# Patient Record
Sex: MALE | Race: WHITE | NOT HISPANIC OR LATINO | Employment: FULL TIME | URBAN - METROPOLITAN AREA
[De-identification: names, ages, dates, MRNs, and addresses within clinical notes are randomized per-mention and may not be internally consistent; named-entity substitution may affect disease eponyms.]

---

## 2019-11-13 LAB
LEFT EYE DIABETIC RETINOPATHY: NORMAL
RIGHT EYE DIABETIC RETINOPATHY: NORMAL

## 2020-04-15 ENCOUNTER — TELEMEDICINE (OUTPATIENT)
Dept: FAMILY MEDICINE CLINIC | Facility: CLINIC | Age: 30
End: 2020-04-15
Payer: COMMERCIAL

## 2020-04-15 DIAGNOSIS — Z20.828 EXPOSURE TO SARS-ASSOCIATED CORONAVIRUS: Primary | ICD-10-CM

## 2020-04-15 DIAGNOSIS — Z20.828 EXPOSURE TO SARS-ASSOCIATED CORONAVIRUS: ICD-10-CM

## 2020-04-15 PROCEDURE — 87635 SARS-COV-2 COVID-19 AMP PRB: CPT

## 2020-04-15 PROCEDURE — 99203 OFFICE O/P NEW LOW 30 MIN: CPT | Performed by: NURSE PRACTITIONER

## 2020-04-17 LAB — SARS-COV-2 RNA SPEC QL NAA+PROBE: NOT DETECTED

## 2020-06-15 DIAGNOSIS — E10.9 TYPE 1 DIABETES MELLITUS ON INSULIN THERAPY (HCC): Primary | ICD-10-CM

## 2020-06-15 RX ORDER — INSULIN LISPRO 100 [IU]/ML
INJECTION, SOLUTION INTRAVENOUS; SUBCUTANEOUS
COMMUNITY
Start: 2016-08-11 | End: 2020-06-15 | Stop reason: SDUPTHER

## 2020-06-15 RX ORDER — LANCETS
EACH MISCELLANEOUS
COMMUNITY
Start: 2014-07-22 | End: 2022-04-07 | Stop reason: ALTCHOICE

## 2020-06-15 RX ORDER — FLASH GLUCOSE SENSOR
KIT MISCELLANEOUS
COMMUNITY
Start: 2020-04-12 | End: 2020-06-15 | Stop reason: SDUPTHER

## 2020-06-16 RX ORDER — FLASH GLUCOSE SENSOR
KIT MISCELLANEOUS
Qty: 15 EACH | Refills: 6 | Status: SHIPPED | OUTPATIENT
Start: 2020-06-16 | End: 2020-08-11 | Stop reason: SDUPTHER

## 2020-06-16 RX ORDER — INSULIN LISPRO 100 [IU]/ML
50 INJECTION, SOLUTION INTRAVENOUS; SUBCUTANEOUS DAILY
Qty: 5 PEN | Refills: 5 | Status: SHIPPED | OUTPATIENT
Start: 2020-06-16 | End: 2020-08-11 | Stop reason: ALTCHOICE

## 2020-08-11 ENCOUNTER — OFFICE VISIT (OUTPATIENT)
Dept: FAMILY MEDICINE CLINIC | Facility: CLINIC | Age: 30
End: 2020-08-11
Payer: COMMERCIAL

## 2020-08-11 VITALS
RESPIRATION RATE: 18 BRPM | WEIGHT: 197 LBS | OXYGEN SATURATION: 98 % | BODY MASS INDEX: 27.58 KG/M2 | HEART RATE: 72 BPM | DIASTOLIC BLOOD PRESSURE: 72 MMHG | HEIGHT: 71 IN | SYSTOLIC BLOOD PRESSURE: 126 MMHG | TEMPERATURE: 98.4 F

## 2020-08-11 DIAGNOSIS — E10.9 TYPE 1 DIABETES MELLITUS ON INSULIN THERAPY (HCC): Primary | ICD-10-CM

## 2020-08-11 DIAGNOSIS — E13.9 DIABETES 1.5, MANAGED AS TYPE 1 (HCC): ICD-10-CM

## 2020-08-11 PROCEDURE — 99213 OFFICE O/P EST LOW 20 MIN: CPT | Performed by: FAMILY MEDICINE

## 2020-08-11 PROCEDURE — 3725F SCREEN DEPRESSION PERFORMED: CPT | Performed by: FAMILY MEDICINE

## 2020-08-11 PROCEDURE — 3008F BODY MASS INDEX DOCD: CPT | Performed by: FAMILY MEDICINE

## 2020-08-11 PROCEDURE — 1036F TOBACCO NON-USER: CPT | Performed by: FAMILY MEDICINE

## 2020-08-11 RX ORDER — FLASH GLUCOSE SENSOR
KIT MISCELLANEOUS
Qty: 4 EACH | Refills: 6 | Status: SHIPPED | OUTPATIENT
Start: 2020-08-11 | End: 2021-02-25 | Stop reason: CLARIF

## 2020-08-11 NOTE — PROGRESS NOTES
Assessment/Plan:  DM 1 well controlled  Mahogany will get his labs completed asap  He is advised to loosen his control to make him more sensitive to low BS  He was advised to decrease his basal accordingly and check more frequently  Also the Dex com G6 CGM was rx and coverage by his insurance was confirmed with the pharmacy  Pt verbalized agreement and understanding of the plan of care as outlined during his OV  No problem-specific Assessment & Plan notes found for this encounter  Problem List Items Addressed This Visit     None      Visit Diagnoses     Type 1 diabetes mellitus on insulin therapy ([address] )    -  Primary    Relevant Medications    HumaLOG 100 UNIT/ML injection    Continuous Blood Gluc Sensor (FreeStyle Simon 14 Day Sensor) MISC    glucose blood test strip    Other Relevant Orders    Continous glucose monitoring dexcom placement    Continous glucose monitoring dexcom intrepretation    Diabetes 1 5, managed as type 1 (HCC)        Relevant Medications    HumaLOG 100 UNIT/ML injection    Other Relevant Orders    CBC and differential    Comprehensive metabolic panel    Lipid panel    TSH, 3rd generation    UA w Reflex to Microscopic w Reflex to Culture    Hemoglobin A1C            Subjective:      Patient ID: Cris No is a 34 y o  male  Mahogany is here to discuss his current state of health and refills on his medications if needed  He states he has had very bad experience with the freestyle simon bs sensor  He would like to try another CGM if possible  He also indicates his bs control is very tight and is experiencing some lows  He has no recent labs so insulin adjustment is difficult  He denies any other complaints        The following portions of the patient's history were reviewed and updated as appropriate: allergies, current medications, past family history, past medical history, past social history, past surgical history and problem list     Review of Systems   Constitutional: Negative for appetite change and fever  HENT: Negative for congestion and trouble swallowing  Respiratory: Negative for shortness of breath  Cardiovascular: Negative for chest pain  Gastrointestinal: Negative for abdominal pain  Genitourinary: Negative for difficulty urinating  Musculoskeletal: Negative for myalgias  Neurological: Negative for dizziness  Psychiatric/Behavioral: The patient is not nervous/anxious  Objective:  Patient's shoes and socks were not removed  Right Foot/Ankle   Right Foot Inspection  Skin Exam: skin normal skin not intact, no dry skin, no warmth, no callus, no erythema, no maceration, no abnormal color, no pre-ulcer, no ulcer and no callus                                  Left Foot/Ankle  Left Foot Inspection  Skin Exam: skin normalskin not intact, no dry skin, no warmth, no erythema, no maceration, normal color, no pre-ulcer, no ulcer and no callus                                             Assign Risk Category:  No deformity present; No loss of protective sensation; No weak pulses       Risk: 0        /72 (BP Location: Left arm, Patient Position: Sitting, Cuff Size: Large)   Pulse 72   Temp 98 4 °F (36 9 °C) (Temporal)   Resp 18   Ht 5' 11" (1 803 m)   Wt 89 4 kg (197 lb)   SpO2 98%   BMI 27 48 kg/m²          Physical Exam  Vitals signs and nursing note reviewed  Constitutional:       General: He is not in acute distress  Appearance: He is well-developed  HENT:      Head: Normocephalic  Right Ear: External ear normal       Left Ear: External ear normal    Eyes:      Pupils: Pupils are equal, round, and reactive to light  Neck:      Musculoskeletal: Normal range of motion  Cardiovascular:      Rate and Rhythm: Normal rate and regular rhythm  Pulses: no weak pulses  Pulmonary:      Effort: Pulmonary effort is normal       Breath sounds: Normal breath sounds  Abdominal:      Palpations: Abdomen is soft     Musculoskeletal: Normal range of motion  Feet:      Right foot:      Skin integrity: No ulcer, skin breakdown, erythema, warmth, callus or dry skin  Left foot:      Skin integrity: No ulcer, skin breakdown, erythema, warmth, callus or dry skin  Skin:     General: Skin is warm and dry  Neurological:      Mental Status: He is alert and oriented to person, place, and time  Psychiatric:         Behavior: Behavior normal          Thought Content:  Thought content normal          Judgment: Judgment normal

## 2020-11-17 ENCOUNTER — TELEMEDICINE (OUTPATIENT)
Dept: FAMILY MEDICINE CLINIC | Facility: CLINIC | Age: 30
End: 2020-11-17
Payer: COMMERCIAL

## 2020-11-17 DIAGNOSIS — R53.83 OTHER FATIGUE: ICD-10-CM

## 2020-11-17 DIAGNOSIS — R53.83 OTHER FATIGUE: Primary | ICD-10-CM

## 2020-11-17 PROCEDURE — U0003 INFECTIOUS AGENT DETECTION BY NUCLEIC ACID (DNA OR RNA); SEVERE ACUTE RESPIRATORY SYNDROME CORONAVIRUS 2 (SARS-COV-2) (CORONAVIRUS DISEASE [COVID-19]), AMPLIFIED PROBE TECHNIQUE, MAKING USE OF HIGH THROUGHPUT TECHNOLOGIES AS DESCRIBED BY CMS-2020-01-R: HCPCS | Performed by: NURSE PRACTITIONER

## 2020-11-17 PROCEDURE — 99213 OFFICE O/P EST LOW 20 MIN: CPT | Performed by: NURSE PRACTITIONER

## 2020-11-19 ENCOUNTER — TELEPHONE (OUTPATIENT)
Dept: FAMILY MEDICINE CLINIC | Facility: CLINIC | Age: 30
End: 2020-11-19

## 2020-11-19 LAB — SARS-COV-2 RNA SPEC QL NAA+PROBE: NOT DETECTED

## 2020-12-05 ENCOUNTER — LAB (OUTPATIENT)
Dept: LAB | Facility: CLINIC | Age: 30
End: 2020-12-05
Payer: COMMERCIAL

## 2020-12-05 DIAGNOSIS — E13.9 DIABETES 1.5, MANAGED AS TYPE 1 (HCC): ICD-10-CM

## 2020-12-05 LAB
ALBUMIN SERPL BCP-MCNC: 4.1 G/DL (ref 3.5–5)
ALP SERPL-CCNC: 59 U/L (ref 46–116)
ALT SERPL W P-5'-P-CCNC: 24 U/L (ref 12–78)
AMORPH URATE CRY URNS QL MICRO: NORMAL /HPF
ANION GAP SERPL CALCULATED.3IONS-SCNC: 5 MMOL/L (ref 4–13)
AST SERPL W P-5'-P-CCNC: 10 U/L (ref 5–45)
BACTERIA UR QL AUTO: NORMAL /HPF
BASOPHILS # BLD AUTO: 0.06 THOUSANDS/ΜL (ref 0–0.1)
BASOPHILS NFR BLD AUTO: 1 % (ref 0–1)
BILIRUB SERPL-MCNC: 0.46 MG/DL (ref 0.2–1)
BILIRUB UR QL STRIP: NEGATIVE
BUN SERPL-MCNC: 7 MG/DL (ref 5–25)
CALCIUM SERPL-MCNC: 9.5 MG/DL (ref 8.3–10.1)
CHLORIDE SERPL-SCNC: 104 MMOL/L (ref 100–108)
CHOLEST SERPL-MCNC: 121 MG/DL (ref 50–200)
CLARITY UR: CLEAR
CO2 SERPL-SCNC: 29 MMOL/L (ref 21–32)
COLOR UR: YELLOW
CREAT SERPL-MCNC: 0.81 MG/DL (ref 0.6–1.3)
EOSINOPHIL # BLD AUTO: 0.2 THOUSAND/ΜL (ref 0–0.61)
EOSINOPHIL NFR BLD AUTO: 3 % (ref 0–6)
ERYTHROCYTE [DISTWIDTH] IN BLOOD BY AUTOMATED COUNT: 12.9 % (ref 11.6–15.1)
EST. AVERAGE GLUCOSE BLD GHB EST-MCNC: 160 MG/DL
GFR SERPL CREATININE-BSD FRML MDRD: 121 ML/MIN/1.73SQ M
GLUCOSE P FAST SERPL-MCNC: 284 MG/DL (ref 65–99)
GLUCOSE UR STRIP-MCNC: ABNORMAL MG/DL
HBA1C MFR BLD: 7.2 %
HCT VFR BLD AUTO: 43.1 % (ref 36.5–49.3)
HDLC SERPL-MCNC: 54 MG/DL
HGB BLD-MCNC: 14.6 G/DL (ref 12–17)
HGB UR QL STRIP.AUTO: NEGATIVE
IMM GRANULOCYTES # BLD AUTO: 0.01 THOUSAND/UL (ref 0–0.2)
IMM GRANULOCYTES NFR BLD AUTO: 0 % (ref 0–2)
KETONES UR STRIP-MCNC: NEGATIVE MG/DL
LDLC SERPL CALC-MCNC: 49 MG/DL (ref 0–100)
LEUKOCYTE ESTERASE UR QL STRIP: ABNORMAL
LYMPHOCYTES # BLD AUTO: 2.7 THOUSANDS/ΜL (ref 0.6–4.47)
LYMPHOCYTES NFR BLD AUTO: 45 % (ref 14–44)
MCH RBC QN AUTO: 30.4 PG (ref 26.8–34.3)
MCHC RBC AUTO-ENTMCNC: 33.9 G/DL (ref 31.4–37.4)
MCV RBC AUTO: 90 FL (ref 82–98)
MONOCYTES # BLD AUTO: 0.61 THOUSAND/ΜL (ref 0.17–1.22)
MONOCYTES NFR BLD AUTO: 10 % (ref 4–12)
NEUTROPHILS # BLD AUTO: 2.53 THOUSANDS/ΜL (ref 1.85–7.62)
NEUTS SEG NFR BLD AUTO: 41 % (ref 43–75)
NITRITE UR QL STRIP: NEGATIVE
NON-SQ EPI CELLS URNS QL MICRO: NORMAL /HPF
NONHDLC SERPL-MCNC: 67 MG/DL
NRBC BLD AUTO-RTO: 0 /100 WBCS
PH UR STRIP.AUTO: 6.5 [PH]
PLATELET # BLD AUTO: 203 THOUSANDS/UL (ref 149–390)
PMV BLD AUTO: 10.8 FL (ref 8.9–12.7)
POTASSIUM SERPL-SCNC: 4.3 MMOL/L (ref 3.5–5.3)
PROT SERPL-MCNC: 7 G/DL (ref 6.4–8.2)
PROT UR STRIP-MCNC: NEGATIVE MG/DL
RBC # BLD AUTO: 4.81 MILLION/UL (ref 3.88–5.62)
RBC #/AREA URNS AUTO: NORMAL /HPF
SODIUM SERPL-SCNC: 138 MMOL/L (ref 136–145)
SP GR UR STRIP.AUTO: 1.02 (ref 1–1.03)
TRIGL SERPL-MCNC: 89 MG/DL
TSH SERPL DL<=0.05 MIU/L-ACNC: 2.16 UIU/ML (ref 0.36–3.74)
UROBILINOGEN UR QL STRIP.AUTO: 0.2 E.U./DL
WBC # BLD AUTO: 6.11 THOUSAND/UL (ref 4.31–10.16)
WBC #/AREA URNS AUTO: NORMAL /HPF

## 2020-12-05 PROCEDURE — 83036 HEMOGLOBIN GLYCOSYLATED A1C: CPT

## 2020-12-05 PROCEDURE — 80061 LIPID PANEL: CPT

## 2020-12-05 PROCEDURE — 84443 ASSAY THYROID STIM HORMONE: CPT

## 2020-12-05 PROCEDURE — 3051F HG A1C>EQUAL 7.0%<8.0%: CPT | Performed by: NURSE PRACTITIONER

## 2020-12-05 PROCEDURE — 80053 COMPREHEN METABOLIC PANEL: CPT

## 2020-12-05 PROCEDURE — 81001 URINALYSIS AUTO W/SCOPE: CPT | Performed by: NURSE PRACTITIONER

## 2020-12-05 PROCEDURE — 85025 COMPLETE CBC W/AUTO DIFF WBC: CPT

## 2020-12-05 PROCEDURE — 36415 COLL VENOUS BLD VENIPUNCTURE: CPT

## 2020-12-09 ENCOUNTER — TELEMEDICINE (OUTPATIENT)
Dept: FAMILY MEDICINE CLINIC | Facility: CLINIC | Age: 30
End: 2020-12-09
Payer: COMMERCIAL

## 2020-12-09 DIAGNOSIS — E10.9 TYPE 1 DIABETES MELLITUS ON INSULIN THERAPY (HCC): ICD-10-CM

## 2020-12-09 DIAGNOSIS — F41.9 ANXIETY: Primary | ICD-10-CM

## 2020-12-09 DIAGNOSIS — F32.0 CURRENT MILD EPISODE OF MAJOR DEPRESSIVE DISORDER WITHOUT PRIOR EPISODE (HCC): ICD-10-CM

## 2020-12-09 PROCEDURE — 1036F TOBACCO NON-USER: CPT | Performed by: NURSE PRACTITIONER

## 2020-12-09 PROCEDURE — 99213 OFFICE O/P EST LOW 20 MIN: CPT | Performed by: NURSE PRACTITIONER

## 2020-12-09 RX ORDER — CLONAZEPAM 0.5 MG/1
0.5 TABLET ORAL 2 TIMES DAILY PRN
Qty: 60 TABLET | Refills: 1 | Status: SHIPPED | OUTPATIENT
Start: 2020-12-09 | End: 2021-02-25 | Stop reason: SDUPTHER

## 2020-12-09 RX ORDER — ESCITALOPRAM OXALATE 5 MG/1
5 TABLET ORAL DAILY
Qty: 30 TABLET | Refills: 3 | Status: SHIPPED | OUTPATIENT
Start: 2020-12-09 | End: 2021-02-25 | Stop reason: CLARIF

## 2020-12-28 DIAGNOSIS — E10.9 TYPE 1 DIABETES MELLITUS ON INSULIN THERAPY (HCC): ICD-10-CM

## 2020-12-28 RX ORDER — FLASH GLUCOSE SENSOR
KIT MISCELLANEOUS
Qty: 4 EACH | Refills: 6 | Status: CANCELLED | OUTPATIENT
Start: 2020-12-28

## 2021-02-20 DIAGNOSIS — F41.9 ANXIETY: ICD-10-CM

## 2021-02-22 RX ORDER — CLONAZEPAM 0.5 MG/1
0.5 TABLET ORAL 2 TIMES DAILY PRN
Qty: 60 TABLET | Refills: 1 | OUTPATIENT
Start: 2021-02-22

## 2021-02-25 ENCOUNTER — OFFICE VISIT (OUTPATIENT)
Dept: FAMILY MEDICINE CLINIC | Facility: CLINIC | Age: 31
End: 2021-02-25
Payer: COMMERCIAL

## 2021-02-25 VITALS
TEMPERATURE: 99 F | WEIGHT: 179 LBS | HEART RATE: 78 BPM | SYSTOLIC BLOOD PRESSURE: 124 MMHG | DIASTOLIC BLOOD PRESSURE: 80 MMHG | BODY MASS INDEX: 25.06 KG/M2 | RESPIRATION RATE: 16 BRPM | OXYGEN SATURATION: 98 % | HEIGHT: 71 IN

## 2021-02-25 DIAGNOSIS — F41.9 ANXIETY: ICD-10-CM

## 2021-02-25 DIAGNOSIS — E10.9 TYPE 1 DIABETES MELLITUS WITHOUT COMPLICATION (HCC): ICD-10-CM

## 2021-02-25 DIAGNOSIS — Z11.4 SCREENING FOR HIV (HUMAN IMMUNODEFICIENCY VIRUS): Primary | ICD-10-CM

## 2021-02-25 DIAGNOSIS — K31.84 SLOW GASTRIC MOTILITY: ICD-10-CM

## 2021-02-25 DIAGNOSIS — R53.83 OTHER FATIGUE: ICD-10-CM

## 2021-02-25 PROCEDURE — 99213 OFFICE O/P EST LOW 20 MIN: CPT | Performed by: NURSE PRACTITIONER

## 2021-02-25 PROCEDURE — 1036F TOBACCO NON-USER: CPT | Performed by: NURSE PRACTITIONER

## 2021-02-25 PROCEDURE — 3008F BODY MASS INDEX DOCD: CPT | Performed by: NURSE PRACTITIONER

## 2021-02-25 RX ORDER — BLOOD-GLUCOSE TRANSMITTER
EACH MISCELLANEOUS
COMMUNITY
Start: 2021-02-20 | End: 2021-07-08 | Stop reason: SDUPTHER

## 2021-02-25 RX ORDER — CLONAZEPAM 0.5 MG/1
0.5 TABLET ORAL 2 TIMES DAILY PRN
Qty: 60 TABLET | Refills: 1 | Status: SHIPPED | OUTPATIENT
Start: 2021-02-25 | End: 2021-05-21 | Stop reason: SDUPTHER

## 2021-02-25 NOTE — PROGRESS NOTES
Assessment/Plan:    Advised patient he physical assessment was unremarkable  Will order some routine follow-up labs he is to complete in 1-3 months will call him with the results for possible in office visit medications were refilled as prescribed Lexapro was DC dosing all possible side effects of the prescribed medications reviewed all questions were answered  Patient verbalized agreement and understanding of the medication as outlined during his office visit today  Depression Screening and Follow-up Plan: Patient assessed for underlying major depression  Brief counseling provided and recommend additional follow-up/re-evaluation next office visit  Patient advised to follow-up with PCP for further management              Problem List Items Addressed This Visit     None      Visit Diagnoses     Screening for HIV (human immunodeficiency virus)    -  Primary    Relevant Orders    HIV 1/2 Antigen/Antibody (4th Generation) w Reflex [facility]    Type 1 diabetes mellitus without complication (HCC)        Relevant Medications    clonazePAM (KlonoPIN) 0 5 mg tablet    Other Relevant Orders    HIV 1/2 Antigen/Antibody (4th Generation) w Reflex [facility]    CBC and differential    Comprehensive metabolic panel    Lipid panel    TSH, 3rd generation    UA w Reflex to Microscopic w Reflex to Culture    Microalbumin / creatinine urine ratio    NM gastric emptying    Hemoglobin A1C    Slow gastric motility        Relevant Orders    NM gastric emptying    Anxiety        Relevant Medications    clonazePAM (KlonoPIN) 0 5 mg tablet    Other Relevant Orders    HIV 1/2 Antigen/Antibody (4th Generation) w Reflex [facility]    CBC and differential    Comprehensive metabolic panel    Lipid panel    TSH, 3rd generation    UA w Reflex to Microscopic w Reflex to Culture    Microalbumin / creatinine urine ratio    NM gastric emptying    Hemoglobin A1C    Other fatigue        Relevant Orders    HIV 1/2 Antigen/Antibody (4th Generation) w Reflex [facility] CBC and differential    Comprehensive metabolic panel    Lipid panel    TSH, 3rd generation    UA w Reflex to Microscopic w Reflex to Culture    Microalbumin / creatinine urine ratio    Hemoglobin A1C            Subjective:      Patient ID: Viktoriya Jackie is a 31 y o  male  Patient is here for a follow-up for some recent change in medication for his increased anxiety  Patient was given Lexapro and clonazepam   He states he stopped the Lexapro as he feels it did not work very well for him he continues the clonazepam stating that he feels like it Evens him out only takes it daily as needed  He states that significantly reduced his anxiety  The following portions of the patient's history were reviewed and updated as appropriate: allergies, current medications, past family history, past medical history, past social history, past surgical history and problem list     Review of Systems   Constitutional: Negative for appetite change and fever  HENT: Negative for congestion and trouble swallowing  Respiratory: Negative for shortness of breath  Cardiovascular: Negative for chest pain  Gastrointestinal: Negative for abdominal pain  Pt has t1 DM for 20 years and stated he feels slow gastric emptying On occasion which causes him to have diabetic lows  Genitourinary: Negative for difficulty urinating  Musculoskeletal: Negative for myalgias  Neurological: Negative for dizziness  Psychiatric/Behavioral: The patient is not nervous/anxious  Objective:      /80 (BP Location: Left arm, Patient Position: Sitting, Cuff Size: Standard)   Pulse 78   Temp 99 °F (37 2 °C) (Temporal)   Resp 16   Ht 5' 11" (1 803 m)   Wt 81 2 kg (179 lb)   SpO2 98%   BMI 24 97 kg/m²          Physical Exam  Vitals signs and nursing note reviewed  Constitutional:       General: He is not in acute distress  Appearance: He is well-developed  HENT:      Head: Normocephalic        Right Ear: External ear normal       Left Ear: External ear normal    Eyes:      Pupils: Pupils are equal, round, and reactive to light  Neck:      Musculoskeletal: Normal range of motion  Cardiovascular:      Rate and Rhythm: Normal rate and regular rhythm  Pulses: Normal pulses  Heart sounds: Normal heart sounds  Pulmonary:      Effort: Pulmonary effort is normal       Breath sounds: Normal breath sounds  Abdominal:      Palpations: Abdomen is soft  Musculoskeletal: Normal range of motion  Skin:     General: Skin is warm and dry  Neurological:      Mental Status: He is alert and oriented to person, place, and time  Psychiatric:         Behavior: Behavior normal          Thought Content:  Thought content normal          Judgment: Judgment normal

## 2021-03-30 DIAGNOSIS — Z23 ENCOUNTER FOR IMMUNIZATION: ICD-10-CM

## 2021-04-02 ENCOUNTER — IMMUNIZATIONS (OUTPATIENT)
Dept: FAMILY MEDICINE CLINIC | Facility: HOSPITAL | Age: 31
End: 2021-04-02

## 2021-04-02 DIAGNOSIS — Z23 ENCOUNTER FOR IMMUNIZATION: Primary | ICD-10-CM

## 2021-04-02 PROCEDURE — 91300 SARS-COV-2 / COVID-19 MRNA VACCINE (PFIZER-BIONTECH) 30 MCG: CPT

## 2021-04-02 PROCEDURE — 0001A SARS-COV-2 / COVID-19 MRNA VACCINE (PFIZER-BIONTECH) 30 MCG: CPT

## 2021-04-26 ENCOUNTER — IMMUNIZATIONS (OUTPATIENT)
Dept: FAMILY MEDICINE CLINIC | Facility: HOSPITAL | Age: 31
End: 2021-04-26

## 2021-04-26 DIAGNOSIS — Z23 ENCOUNTER FOR IMMUNIZATION: Primary | ICD-10-CM

## 2021-04-26 PROCEDURE — 0002A SARS-COV-2 / COVID-19 MRNA VACCINE (PFIZER-BIONTECH) 30 MCG: CPT

## 2021-04-26 PROCEDURE — 91300 SARS-COV-2 / COVID-19 MRNA VACCINE (PFIZER-BIONTECH) 30 MCG: CPT

## 2021-05-10 ENCOUNTER — OFFICE VISIT (OUTPATIENT)
Dept: FAMILY MEDICINE CLINIC | Facility: CLINIC | Age: 31
End: 2021-05-10
Payer: COMMERCIAL

## 2021-05-10 ENCOUNTER — APPOINTMENT (OUTPATIENT)
Dept: RADIOLOGY | Facility: MEDICAL CENTER | Age: 31
End: 2021-05-10
Payer: COMMERCIAL

## 2021-05-10 VITALS
HEART RATE: 78 BPM | BODY MASS INDEX: 25.2 KG/M2 | OXYGEN SATURATION: 98 % | RESPIRATION RATE: 16 BRPM | SYSTOLIC BLOOD PRESSURE: 130 MMHG | WEIGHT: 180 LBS | TEMPERATURE: 98.4 F | HEIGHT: 71 IN | DIASTOLIC BLOOD PRESSURE: 80 MMHG

## 2021-05-10 DIAGNOSIS — M25.561 ACUTE PAIN OF RIGHT KNEE: ICD-10-CM

## 2021-05-10 DIAGNOSIS — M25.561 ACUTE PAIN OF RIGHT KNEE: Primary | ICD-10-CM

## 2021-05-10 PROCEDURE — 1036F TOBACCO NON-USER: CPT | Performed by: NURSE PRACTITIONER

## 2021-05-10 PROCEDURE — 73562 X-RAY EXAM OF KNEE 3: CPT

## 2021-05-10 PROCEDURE — 99213 OFFICE O/P EST LOW 20 MIN: CPT | Performed by: NURSE PRACTITIONER

## 2021-05-10 PROCEDURE — 3008F BODY MASS INDEX DOCD: CPT | Performed by: NURSE PRACTITIONER

## 2021-05-10 RX ORDER — BLOOD-GLUCOSE SENSOR
EACH MISCELLANEOUS
COMMUNITY
Start: 2021-04-18 | End: 2021-07-08 | Stop reason: SDUPTHER

## 2021-05-10 NOTE — PROGRESS NOTES
Assessment/Plan:    No problem-specific Assessment & Plan notes found for this encounter  Problem List Items Addressed This Visit     None      Visit Diagnoses     Acute pain of right knee    -  Primary    Relevant Orders    XR knee 3 vw right non injury            Subjective:      Patient ID: Lesly Bundy is a 31 y o  male  Patient is here for an assessment for right knee pain  It should be noted patient has left knee history of injury and some laxity in that knee but currently no pain  Patient is a well controlled type 1 diabetic and denies any injury related to this knee pain  The following portions of the patient's history were reviewed and updated as appropriate: allergies, current medications, past family history, past medical history, past social history, past surgical history and problem list     Review of Systems   Constitutional: Negative for appetite change and fever  HENT: Negative for congestion and trouble swallowing  Respiratory: Negative for shortness of breath  Cardiovascular: Negative for chest pain  Gastrointestinal: Negative for abdominal pain  Genitourinary: Negative for difficulty urinating  Musculoskeletal: Positive for arthralgias (r knee pain Most painful with going up and down stairs  )  Negative for myalgias  Neurological: Negative for dizziness  Psychiatric/Behavioral: The patient is not nervous/anxious  Objective: There were no vitals taken for this visit  Physical Exam  Constitutional:       Appearance: Normal appearance  He is normal weight  HENT:      Right Ear: External ear normal       Left Ear: External ear normal    Neck:      Musculoskeletal: Normal range of motion  Cardiovascular:      Rate and Rhythm: Normal rate and regular rhythm  Heart sounds: Normal heart sounds  Pulmonary:      Effort: Pulmonary effort is normal       Breath sounds: Normal breath sounds     Musculoskeletal:         General: Tenderness (r knee stable tenderness ) present  Neurological:      General: No focal deficit present  Mental Status: He is alert and oriented to person, place, and time     Psychiatric:         Mood and Affect: Mood normal          Behavior: Behavior normal

## 2021-05-18 DIAGNOSIS — E10.9 TYPE 1 DIABETES MELLITUS WITHOUT COMPLICATION (HCC): ICD-10-CM

## 2021-05-18 DIAGNOSIS — F41.9 ANXIETY: ICD-10-CM

## 2021-05-21 DIAGNOSIS — F41.9 ANXIETY: ICD-10-CM

## 2021-05-21 DIAGNOSIS — E10.9 TYPE 1 DIABETES MELLITUS WITHOUT COMPLICATION (HCC): ICD-10-CM

## 2021-05-25 RX ORDER — CLONAZEPAM 0.5 MG/1
0.5 TABLET ORAL 2 TIMES DAILY PRN
Qty: 60 TABLET | Refills: 1 | OUTPATIENT
Start: 2021-05-25

## 2021-05-25 RX ORDER — CLONAZEPAM 0.5 MG/1
0.5 TABLET ORAL 2 TIMES DAILY PRN
Qty: 60 TABLET | Refills: 1 | Status: SHIPPED | OUTPATIENT
Start: 2021-05-25 | End: 2021-09-10 | Stop reason: SDUPTHER

## 2021-06-08 ENCOUNTER — OFFICE VISIT (OUTPATIENT)
Dept: URGENT CARE | Facility: CLINIC | Age: 31
End: 2021-06-08
Payer: COMMERCIAL

## 2021-06-08 VITALS
DIASTOLIC BLOOD PRESSURE: 79 MMHG | BODY MASS INDEX: 25.76 KG/M2 | SYSTOLIC BLOOD PRESSURE: 144 MMHG | OXYGEN SATURATION: 99 % | HEIGHT: 71 IN | HEART RATE: 77 BPM | TEMPERATURE: 98.9 F | RESPIRATION RATE: 18 BRPM | WEIGHT: 184 LBS

## 2021-06-08 DIAGNOSIS — R05.9 COUGH: Primary | ICD-10-CM

## 2021-06-08 PROCEDURE — 99203 OFFICE O/P NEW LOW 30 MIN: CPT | Performed by: PHYSICIAN ASSISTANT

## 2021-06-08 RX ORDER — ALBUTEROL SULFATE 90 UG/1
2 AEROSOL, METERED RESPIRATORY (INHALATION) 4 TIMES DAILY
Qty: 8 G | Refills: 0 | Status: SHIPPED | OUTPATIENT
Start: 2021-06-08 | End: 2021-08-16

## 2021-06-08 RX ORDER — BENZONATATE 100 MG/1
100 CAPSULE ORAL 3 TIMES DAILY PRN
Qty: 30 CAPSULE | Refills: 0 | Status: SHIPPED | OUTPATIENT
Start: 2021-06-08 | End: 2021-08-16

## 2021-06-08 RX ORDER — AMOXICILLIN AND CLAVULANATE POTASSIUM 875; 125 MG/1; MG/1
1 TABLET, FILM COATED ORAL EVERY 12 HOURS SCHEDULED
Qty: 14 TABLET | Refills: 0 | Status: SHIPPED | OUTPATIENT
Start: 2021-06-08 | End: 2021-06-15

## 2021-06-08 NOTE — PROGRESS NOTES
[address]        NAME: Josh Holden is a 31 y o  male  : 1990    MRN: [id number]  DATE: 2021  TIME: 11:12 AM    Assessment and Plan   Cough [R05]  1  Cough  amoxicillin-clavulanate (AUGMENTIN) 875-125 mg per tablet    albuterol (PROVENTIL HFA,VENTOLIN HFA) 90 mcg/act inhaler    benzonatate (TESSALON PERLES) 100 mg capsule    CANCELED: Novel Coronavirus (COVID-19), PCR [facility] Collected at [facility]    CANCELED: XR chest pa & lateral         Patient Instructions   Patient Instructions     Lungs are clear here  He has some sinus pressure we can treat with antibiotics and cough medicine  Albuterol as needed for chest tightness  Continue Tylenol  Follow-up with PCP in few days  If worse go to the ER  He is vaccinated against COVID  Follow up with PCP in 3-5 days  Proceed to  ER if symptoms worsen  Chief Complaint     Chief Complaint   Patient presents with    Sinus Problem     pt c/o of sinus pressure with painful cough, sore throat for 2 days ago getting worse         History of Present Illness         61-year-old male presents to the clinic with sinus pressure cough chest congestion shortness of breath for 2 days  No fever  Taking over-the-counter cold medicines with no relief  His chest hurts when he coughs  No nausea vomiting  He is vaccinated against COVID  No known exposures  Review of Systems   Review of Systems   Constitutional: Negative for chills and fever  HENT: Positive for congestion, postnasal drip, rhinorrhea, sinus pressure, sinus pain and sore throat  Negative for ear pain and facial swelling  Eyes: Negative for pain, redness and visual disturbance  Respiratory: Positive for cough, chest tightness and shortness of breath  Negative for wheezing  Cardiovascular: Negative for chest pain and palpitations  Gastrointestinal: Negative for abdominal pain, diarrhea, nausea and vomiting     Neurological: Negative for dizziness and headaches  Current Medications       Current Outpatient Medications:     clonazePAM (KlonoPIN) 0 5 mg tablet, Take 1 tablet (0 5 mg total) by mouth 2 (two) times a day as needed for anxiety, Disp: 60 tablet, Rfl: 1    Continuous Blood Gluc Sensor (Dexcom G6 Sensor) MISC, USE SUBCUTANEOUSLY EVERY 11 DAYS AS DIRECTED, Disp: , Rfl:     Continuous Blood Gluc Transmit (Dexcom G6 Transmitter) MISC, , Disp: , Rfl:     glucose blood test strip, 1 each by Other route 4 (four) times a day Use as instructed, Disp: 200 each, Rfl: 6    HumaLOG 100 UNIT/ML injection, 100 Units, Disp: , Rfl:     Accu-Chek Softclix Lancets lancets, use to check sugars  DX IDDM  , Disp: , Rfl:     albuterol (PROVENTIL HFA,VENTOLIN HFA) 90 mcg/act inhaler, Inhale 2 puffs 4 (four) times a day, Disp: 8 g, Rfl: 0    amoxicillin-clavulanate (AUGMENTIN) 875-125 mg per tablet, Take 1 tablet by mouth every 12 (twelve) hours for 7 days, Disp: 14 tablet, Rfl: 0    benzonatate (TESSALON PERLES) 100 mg capsule, Take 1 capsule (100 mg total) by mouth 3 (three) times a day as needed for cough, Disp: 30 capsule, Rfl: 0    Current Allergies     Allergies as of 06/08/2021    (No Known Allergies)            The following portions of the patient's history were reviewed and updated as appropriate: allergies, current medications, past family history, past medical history, past social history, past surgical history and problem list      History reviewed  No pertinent past medical history  Past Surgical History:   Procedure Laterality Date    ANKLE FRACTURE SURGERY Left        Family History   Problem Relation Age of Onset    No Known Problems Mother     No Known Problems Father          Medications have been verified          Objective   /79   Pulse 77   Temp 98 9 °F (37 2 °C)   Resp 18   Ht 5' 11" (1 803 m)   Wt 83 5 kg (184 lb)   SpO2 99%   BMI 25 66 kg/m²        Physical Exam     Physical Exam  Constitutional:       General: He is not in acute distress  Appearance: He is well-developed  HENT:      Head: Normocephalic and atraumatic  Right Ear: Tympanic membrane, ear canal and external ear normal  No middle ear effusion  Tympanic membrane is not erythematous, retracted or bulging  Left Ear: Tympanic membrane, ear canal and external ear normal   No middle ear effusion  Tympanic membrane is not erythematous, retracted or bulging  Nose: Mucosal edema and congestion present  No rhinorrhea  Right Sinus: No maxillary sinus tenderness or frontal sinus tenderness  Left Sinus: No maxillary sinus tenderness or frontal sinus tenderness  Mouth/Throat:      Pharynx: Posterior oropharyngeal erythema present  Comments: Post nasal drip  Eyes:      General:         Right eye: No discharge  Left eye: No discharge  Conjunctiva/sclera: Conjunctivae normal       Right eye: Right conjunctiva is not injected  No chemosis  Left eye: Left conjunctiva is not injected  No chemosis  Pupils: Pupils are equal, round, and reactive to light  Neck:      Musculoskeletal: Normal range of motion and neck supple  Cardiovascular:      Rate and Rhythm: Normal rate and regular rhythm  Heart sounds: Normal heart sounds  Pulmonary:      Effort: Pulmonary effort is normal  No respiratory distress  Breath sounds: Normal breath sounds  No wheezing or rales  Lymphadenopathy:      Cervical: No cervical adenopathy  Right cervical: No superficial cervical adenopathy  Left cervical: No superficial cervical adenopathy  Skin:     General: Skin is warm and dry  Findings: No rash  Neurological:      Mental Status: He is alert and oriented to person, place, and time  Cranial Nerves: No cranial nerve deficit

## 2021-06-08 NOTE — PATIENT INSTRUCTIONS
Lungs are clear here  He has some sinus pressure we can treat with antibiotics and cough medicine  Albuterol as needed for chest tightness  Continue Tylenol  Follow-up with PCP in few days  If worse go to the ER  He is vaccinated against COVID

## 2021-07-08 DIAGNOSIS — E10.65 TYPE 1 DIABETES MELLITUS WITH HYPERGLYCEMIA (HCC): Primary | ICD-10-CM

## 2021-07-08 RX ORDER — BLOOD-GLUCOSE SENSOR
EACH MISCELLANEOUS
Qty: 3 EACH | Refills: 2 | Status: SHIPPED | OUTPATIENT
Start: 2021-07-08 | End: 2022-01-24 | Stop reason: SDUPTHER

## 2021-07-08 RX ORDER — BLOOD-GLUCOSE TRANSMITTER
1 EACH MISCELLANEOUS
Qty: 1 EACH | Refills: 1 | Status: SHIPPED | OUTPATIENT
Start: 2021-07-08 | End: 2022-02-01 | Stop reason: SDUPTHER

## 2021-07-22 ENCOUNTER — OFFICE VISIT (OUTPATIENT)
Dept: FAMILY MEDICINE CLINIC | Facility: CLINIC | Age: 31
End: 2021-07-22
Payer: COMMERCIAL

## 2021-07-22 VITALS
WEIGHT: 178 LBS | SYSTOLIC BLOOD PRESSURE: 130 MMHG | TEMPERATURE: 98.8 F | OXYGEN SATURATION: 98 % | DIASTOLIC BLOOD PRESSURE: 78 MMHG | RESPIRATION RATE: 16 BRPM | BODY MASS INDEX: 24.92 KG/M2 | HEIGHT: 71 IN | HEART RATE: 70 BPM

## 2021-07-22 DIAGNOSIS — M25.561 CHRONIC PAIN OF RIGHT KNEE: ICD-10-CM

## 2021-07-22 DIAGNOSIS — G56.03 CARPAL TUNNEL SYNDROME, BILATERAL: ICD-10-CM

## 2021-07-22 DIAGNOSIS — E10.649 TYPE 1 DIABETES MELLITUS WITH HYPOGLYCEMIA AND WITHOUT COMA (HCC): ICD-10-CM

## 2021-07-22 DIAGNOSIS — G89.29 CHRONIC PAIN OF RIGHT KNEE: ICD-10-CM

## 2021-07-22 DIAGNOSIS — M25.539 PAIN IN WRIST, UNSPECIFIED LATERALITY: Primary | ICD-10-CM

## 2021-07-22 PROCEDURE — 99214 OFFICE O/P EST MOD 30 MIN: CPT | Performed by: NURSE PRACTITIONER

## 2021-07-22 PROCEDURE — 1036F TOBACCO NON-USER: CPT | Performed by: NURSE PRACTITIONER

## 2021-07-22 PROCEDURE — 3008F BODY MASS INDEX DOCD: CPT | Performed by: NURSE PRACTITIONER

## 2021-07-22 NOTE — PROGRESS NOTES
Assessment/Plan:    Physical assessment is positive for bilateral carpal tunnel syndrome  Patient does have a positive tinels sign BL  Advises this is an ongoing conditionpatient does have type 1 diabetes that is best to refer him to a hand orthopedist specialist   Patient is in agreement that is what he wants today  Will contact Dr Denman Cogan get patient an appointment contact patient with appointment time is available  Till such time Voltaren gel was given a to be applied to wrist area for reducing inflammation possibly reduce some of the symptoms  All questions were answered patient is advised his routine labs done again seen back in the office in a month or so to discuss any abnormalities that exist there  Patient verbalized agreement understanding thank me for the visit stating he is very happy with the outcome  Dosing all possible side effects of the prescribed medications or medications that had been prescribed in the past were reviewed and all questions were answered  Patient verbalized agreement and understanding of the plan of care as outlined during the office visit today return to office as indicated or sooner if a problem arises  Problem List Items Addressed This Visit        Endocrine    Type 1 diabetes mellitus with hypoglycemia and without coma ([facility] [address] )      Other Visit Diagnoses     Pain in wrist, unspecified laterality    -  Primary    Relevant Orders    Ambulatory referral to Hand Surgery    Carpal tunnel syndrome, bilateral        Relevant Medications    Diclofenac Sodium (VOLTAREN) 1 %    Other Relevant Orders    Ambulatory referral to Hand Surgery    Chronic pain of right knee        Relevant Medications    Diclofenac Sodium (VOLTAREN) 1 %    Other Relevant Orders    Ambulatory referral to Hand Surgery            Subjective:      Patient ID: Praveen Grullon is a 31 y o  male  Patient is to be seen today for bilateral wrist pain    Patient does have a past medical history of carpal tunnel that has been calmer but seems to be flaring up recently  Patient states that his hands are numb none tingling a pretty much 80-90% of his day  He denies any other related symptoms  It should be noted patient is a type 1 diabetic with moderate to well control  Patient's most recent labs were 12 months ago his A1c was 7 2  Patient is insulin dependent and uses a constant glucose monitor to maintain his control  The following portions of the patient's history were reviewed and updated as appropriate: allergies, current medications, past family history, past medical history, past social history, past surgical history and problem list     Review of Systems   Constitutional: Negative for appetite change and fever  HENT: Negative for congestion and trouble swallowing  Respiratory: Negative for shortness of breath  Cardiovascular: Negative for chest pain  Gastrointestinal: Negative for abdominal pain  Genitourinary: Negative for difficulty urinating  Musculoskeletal: Positive for arthralgias (Bilateral wrist pain that radiates more so up into the forearm on the right side and to some degree on the left side  )  Negative for myalgias  Neurological: Negative for dizziness  Psychiatric/Behavioral: The patient is not nervous/anxious  Objective:      /78 (BP Location: Left arm, Patient Position: Sitting, Cuff Size: Standard)   Pulse 70   Temp 98 8 °F (37 1 °C) (Temporal)   Resp 16   Ht 5' 11" (1 803 m)   Wt 80 7 kg (178 lb)   SpO2 98%   BMI 24 83 kg/m²          Physical Exam  Vitals and nursing note reviewed  Constitutional:       General: He is not in acute distress  Appearance: Normal appearance  He is well-developed  HENT:      Head: Normocephalic  Eyes:      Pupils: Pupils are equal, round, and reactive to light  Cardiovascular:      Rate and Rhythm: Normal rate and regular rhythm  Heart sounds: Normal heart sounds     Pulmonary:      Effort: Pulmonary effort is normal       Breath sounds: Normal breath sounds  Abdominal:      Palpations: Abdomen is soft  Musculoskeletal:         General: Tenderness (Bilateral wrist with positive tinels sign) present  Normal range of motion  Cervical back: Normal range of motion  Skin:     General: Skin is warm and dry  Neurological:      Mental Status: He is alert and oriented to person, place, and time  Psychiatric:         Behavior: Behavior normal          Thought Content:  Thought content normal          Judgment: Judgment normal

## 2021-08-06 ENCOUNTER — APPOINTMENT (OUTPATIENT)
Dept: LAB | Facility: MEDICAL CENTER | Age: 31
End: 2021-08-06
Payer: COMMERCIAL

## 2021-08-06 DIAGNOSIS — R53.83 OTHER FATIGUE: ICD-10-CM

## 2021-08-06 DIAGNOSIS — E10.9 TYPE 1 DIABETES MELLITUS WITHOUT COMPLICATION (HCC): ICD-10-CM

## 2021-08-06 DIAGNOSIS — F41.9 ANXIETY: ICD-10-CM

## 2021-08-06 DIAGNOSIS — Z11.4 SCREENING FOR HIV (HUMAN IMMUNODEFICIENCY VIRUS): ICD-10-CM

## 2021-08-06 LAB
ALBUMIN SERPL BCP-MCNC: 3.7 G/DL (ref 3.5–5)
ALP SERPL-CCNC: 54 U/L (ref 46–116)
ALT SERPL W P-5'-P-CCNC: 23 U/L (ref 12–78)
ANION GAP SERPL CALCULATED.3IONS-SCNC: 2 MMOL/L (ref 4–13)
AST SERPL W P-5'-P-CCNC: 11 U/L (ref 5–45)
BASOPHILS # BLD AUTO: 0.07 THOUSANDS/ΜL (ref 0–0.1)
BASOPHILS NFR BLD AUTO: 1 % (ref 0–1)
BILIRUB SERPL-MCNC: 0.55 MG/DL (ref 0.2–1)
BILIRUB UR QL STRIP: NEGATIVE
BUN SERPL-MCNC: 10 MG/DL (ref 5–25)
CALCIUM SERPL-MCNC: 9.1 MG/DL (ref 8.3–10.1)
CHLORIDE SERPL-SCNC: 107 MMOL/L (ref 100–108)
CHOLEST SERPL-MCNC: 124 MG/DL (ref 50–200)
CLARITY UR: CLEAR
CO2 SERPL-SCNC: 28 MMOL/L (ref 21–32)
COLOR UR: NORMAL
CREAT SERPL-MCNC: 0.7 MG/DL (ref 0.6–1.3)
CREAT UR-MCNC: 172 MG/DL
EOSINOPHIL # BLD AUTO: 0.29 THOUSAND/ΜL (ref 0–0.61)
EOSINOPHIL NFR BLD AUTO: 5 % (ref 0–6)
ERYTHROCYTE [DISTWIDTH] IN BLOOD BY AUTOMATED COUNT: 12.5 % (ref 11.6–15.1)
EST. AVERAGE GLUCOSE BLD GHB EST-MCNC: 146 MG/DL
GFR SERPL CREATININE-BSD FRML MDRD: 128 ML/MIN/1.73SQ M
GLUCOSE P FAST SERPL-MCNC: 131 MG/DL (ref 65–99)
GLUCOSE UR STRIP-MCNC: NEGATIVE MG/DL
HBA1C MFR BLD: 6.7 %
HCT VFR BLD AUTO: 41.9 % (ref 36.5–49.3)
HDLC SERPL-MCNC: 71 MG/DL
HGB BLD-MCNC: 13.8 G/DL (ref 12–17)
HGB UR QL STRIP.AUTO: NEGATIVE
IMM GRANULOCYTES # BLD AUTO: 0.01 THOUSAND/UL (ref 0–0.2)
IMM GRANULOCYTES NFR BLD AUTO: 0 % (ref 0–2)
KETONES UR STRIP-MCNC: NEGATIVE MG/DL
LDLC SERPL CALC-MCNC: 42 MG/DL (ref 0–100)
LEUKOCYTE ESTERASE UR QL STRIP: NEGATIVE
LYMPHOCYTES # BLD AUTO: 2.39 THOUSANDS/ΜL (ref 0.6–4.47)
LYMPHOCYTES NFR BLD AUTO: 39 % (ref 14–44)
MCH RBC QN AUTO: 30.4 PG (ref 26.8–34.3)
MCHC RBC AUTO-ENTMCNC: 32.9 G/DL (ref 31.4–37.4)
MCV RBC AUTO: 92 FL (ref 82–98)
MICROALBUMIN UR-MCNC: 8.5 MG/L (ref 0–20)
MICROALBUMIN/CREAT 24H UR: 5 MG/G CREATININE (ref 0–30)
MONOCYTES # BLD AUTO: 0.54 THOUSAND/ΜL (ref 0.17–1.22)
MONOCYTES NFR BLD AUTO: 9 % (ref 4–12)
NEUTROPHILS # BLD AUTO: 2.83 THOUSANDS/ΜL (ref 1.85–7.62)
NEUTS SEG NFR BLD AUTO: 46 % (ref 43–75)
NITRITE UR QL STRIP: NEGATIVE
NONHDLC SERPL-MCNC: 53 MG/DL
NRBC BLD AUTO-RTO: 0 /100 WBCS
PH UR STRIP.AUTO: 6 [PH]
PLATELET # BLD AUTO: 191 THOUSANDS/UL (ref 149–390)
PMV BLD AUTO: 10.6 FL (ref 8.9–12.7)
POTASSIUM SERPL-SCNC: 4.5 MMOL/L (ref 3.5–5.3)
PROT SERPL-MCNC: 7.2 G/DL (ref 6.4–8.2)
PROT UR STRIP-MCNC: NEGATIVE MG/DL
RBC # BLD AUTO: 4.54 MILLION/UL (ref 3.88–5.62)
SODIUM SERPL-SCNC: 137 MMOL/L (ref 136–145)
SP GR UR STRIP.AUTO: 1.02 (ref 1–1.03)
TRIGL SERPL-MCNC: 53 MG/DL
TSH SERPL DL<=0.05 MIU/L-ACNC: 3.14 UIU/ML (ref 0.36–3.74)
UROBILINOGEN UR QL STRIP.AUTO: 0.2 E.U./DL
WBC # BLD AUTO: 6.13 THOUSAND/UL (ref 4.31–10.16)

## 2021-08-06 PROCEDURE — 80061 LIPID PANEL: CPT

## 2021-08-06 PROCEDURE — 80053 COMPREHEN METABOLIC PANEL: CPT

## 2021-08-06 PROCEDURE — 3061F NEG MICROALBUMINURIA REV: CPT | Performed by: NURSE PRACTITIONER

## 2021-08-06 PROCEDURE — 87389 HIV-1 AG W/HIV-1&-2 AB AG IA: CPT

## 2021-08-06 PROCEDURE — 82570 ASSAY OF URINE CREATININE: CPT | Performed by: NURSE PRACTITIONER

## 2021-08-06 PROCEDURE — 81003 URINALYSIS AUTO W/O SCOPE: CPT | Performed by: NURSE PRACTITIONER

## 2021-08-06 PROCEDURE — 84443 ASSAY THYROID STIM HORMONE: CPT

## 2021-08-06 PROCEDURE — 82043 UR ALBUMIN QUANTITATIVE: CPT | Performed by: NURSE PRACTITIONER

## 2021-08-06 PROCEDURE — 3044F HG A1C LEVEL LT 7.0%: CPT | Performed by: NURSE PRACTITIONER

## 2021-08-06 PROCEDURE — 85025 COMPLETE CBC W/AUTO DIFF WBC: CPT

## 2021-08-06 PROCEDURE — 83036 HEMOGLOBIN GLYCOSYLATED A1C: CPT

## 2021-08-06 PROCEDURE — 36415 COLL VENOUS BLD VENIPUNCTURE: CPT

## 2021-08-08 LAB — HIV 1+2 AB+HIV1 P24 AG SERPL QL IA: NORMAL

## 2021-08-16 ENCOUNTER — OFFICE VISIT (OUTPATIENT)
Dept: OBGYN CLINIC | Facility: MEDICAL CENTER | Age: 31
End: 2021-08-16
Payer: COMMERCIAL

## 2021-08-16 VITALS
BODY MASS INDEX: 24.03 KG/M2 | HEART RATE: 58 BPM | DIASTOLIC BLOOD PRESSURE: 77 MMHG | SYSTOLIC BLOOD PRESSURE: 120 MMHG | HEIGHT: 72 IN | WEIGHT: 177.4 LBS

## 2021-08-16 DIAGNOSIS — R20.0 BILATERAL HAND NUMBNESS: ICD-10-CM

## 2021-08-16 PROCEDURE — 3008F BODY MASS INDEX DOCD: CPT | Performed by: ORTHOPAEDIC SURGERY

## 2021-08-16 PROCEDURE — 99243 OFF/OP CNSLTJ NEW/EST LOW 30: CPT | Performed by: ORTHOPAEDIC SURGERY

## 2021-08-16 PROCEDURE — 1036F TOBACCO NON-USER: CPT | Performed by: ORTHOPAEDIC SURGERY

## 2021-08-16 PROCEDURE — 3078F DIAST BP <80 MM HG: CPT | Performed by: ORTHOPAEDIC SURGERY

## 2021-08-16 PROCEDURE — 3074F SYST BP LT 130 MM HG: CPT | Performed by: ORTHOPAEDIC SURGERY

## 2021-08-16 NOTE — PROGRESS NOTES
CHIEF COMPLAINT:  Chief Complaint   Patient presents with    Left Hand - Pain    Right Hand - Pain       SUBJECTIVE:  Ying Dodge is a 31y o  year old RHD male who presents  For evaluation of bilateral hand numbness  Patient was referred for orthopedic consultation by his PCP Ilan Sharma  Patient states for the past 10 years he has been having numbness, tingling, burning pains in the bilateral small, ring, long fingers that has been worsening over time  Patient also notes new onset of symptoms waking him up at night  Denies any injury or inciting event  Patient states that he had an EDx about 7 years ago confirming a diagnosis of carpal tunnel syndrome  He also had 3 sets of steroid injections for the carpal tunnel which he states provided him lasting relief  Patient also wears night splints which do help somewhat  Denies any history of surgery for this condition  Patient does have a history of type 1 diabetes  Patient offers no additional complaints at this time  PAST MEDICAL HISTORY:  History reviewed  No pertinent past medical history      PAST SURGICAL HISTORY:  Past Surgical History:   Procedure Laterality Date    ANKLE FRACTURE SURGERY Left        FAMILY HISTORY:  Family History   Problem Relation Age of Onset    No Known Problems Mother     No Known Problems Father        SOCIAL HISTORY:  Social History     Tobacco Use    Smoking status: Former Smoker     Types: Cigarettes    Smokeless tobacco: Never Used   Vaping Use    Vaping Use: Never used   Substance Use Topics    Alcohol use: Yes     Comment: occasional    Drug use: Never       MEDICATIONS:    Current Outpatient Medications:     clonazePAM (KlonoPIN) 0 5 mg tablet, Take 1 tablet (0 5 mg total) by mouth 2 (two) times a day as needed for anxiety, Disp: 60 tablet, Rfl: 1    Continuous Blood Gluc Sensor (Dexcom G6 Sensor) MISC, Use subcutaneously every 10 days as directed, Disp: 3 each, Rfl: 2    Continuous Blood Gluc Transmit (Dexcom G6 Transmitter) MISC, Inject 1 Units under the skin every 6 (six) months, Disp: 1 each, Rfl: 1    glucose blood test strip, 1 each by Other route 4 (four) times a day Use as instructed, Disp: 200 each, Rfl: 6    HumaLOG 100 UNIT/ML injection, 100 Units, Disp: , Rfl:     Accu-Chek Softclix Lancets lancets, use to check sugars  DX IDDM  (Patient not taking: Reported on 7/22/2021), Disp: , Rfl:     Diclofenac Sodium (VOLTAREN) 1 %, Apply 2 g topically 4 (four) times a day (Patient not taking: Reported on 8/16/2021), Disp: 100 g, Rfl: 2    ALLERGIES:  Allergies   Allergen Reactions    Grass Pollen(K-O-R-T-Swt James) Cough and Sneezing       REVIEW OF SYSTEMS:  Review of Systems   Constitutional: Negative for appetite change and unexpected weight change  HENT: Negative for congestion and trouble swallowing  Eyes: Negative for visual disturbance  Respiratory: Negative for cough and shortness of breath  Cardiovascular: Negative for chest pain and palpitations  Gastrointestinal: Negative for nausea and vomiting  Endocrine: Negative for cold intolerance and heat intolerance  Musculoskeletal: Negative for gait problem and myalgias  Skin: Negative for rash  Neurological: Positive for numbness         VITALS:  Vitals:    08/16/21 1442   BP: 120/77   Pulse: 58       LABS:  HgA1c:   Lab Results   Component Value Date    HGBA1C 6 7 (H) 08/06/2021     BMP:   Lab Results   Component Value Date    CALCIUM 9 1 08/06/2021    K 4 5 08/06/2021    CO2 28 08/06/2021     08/06/2021    BUN 10 08/06/2021    CREATININE 0 70 08/06/2021       _____________________________________________________  PHYSICAL EXAMINATION:  General: well developed and well nourished, alert, oriented times 3 and appears comfortable  Psychiatric: Normal  HEENT: Trachea Midline, No torticollis  Pulmonary: No audible wheezing or strider  Cardiovascular: No discernable arrhythmia   Skin: No masses, erythema, lacerations, fluctation, ulcerations  Neurovascular: Motor Intact to the Median, Ulnar, Radial Nerve and Pulses Intact    MUSCULOSKELETAL EXAMINATION:  Bilateral Carpal Tunnel Exam:    Negative thenar atrophy  Positive phalen's test  Positive carpal tunnel compression  Positive tinels over median nerve at the wrist   Opposition strength 5/5  Abduction strength 5/5  Bilateral Ulnar Nerve Exam:    Negative intrinsic atrophy  Negative deformity at the elbow  Full range of motion with flexion and extension of the elbow without ulnar nerve subluxation  Positive ulnar nerve compression test at the elbow  Positive tinels over the ulnar nerve at the elbow  Negative cross finger test in the fingers  FDP strength is 5/5 to the ring finger  FDP strength is 5/5 to the small finger  Intrinsic strength 5/5      2 point discrimination is 4 mm throughout       ___________________________________________________  STUDIES REVIEWED:  No studies reviewed  PROCEDURES PERFORMED:  No Procedures performed today    _____________________________________________________  ASSESSMENT/PLAN:    Bilateral hand numbness and tingling  · BUE EDx ordered to evaluate for carpal and cubital tunnel syndrome  · Patient may continue his night splints to help prevent nighttime symptoms  · Patient was advised he can try vitamin B6 for his symptoms  Tylenol/NSAIDs as needed for pain  · He may continue all activities to his tolerance  · Contact the office with any further questions or concerns prior to next follow-up  · Follow-up after EDx to discuss results and treatment plan moving forward  Follow Up:  Return for After EDx      To Do Next Visit:  Re-evaluation of current issue    Scribe Attestation    I,:  Carlos Brown am acting as a scribe while in the presence of the attending physician :       I,:  Stephanie Hicks MD personally performed the services described in this documentation    as scribed in my presence :

## 2021-09-03 ENCOUNTER — TELEPHONE (OUTPATIENT)
Dept: FAMILY MEDICINE CLINIC | Facility: CLINIC | Age: 31
End: 2021-09-03

## 2021-09-03 NOTE — TELEPHONE ENCOUNTER
Patient states he went to [facility] care and tested positive for covid  Patient is immunocompromised and asked if he could start monocolonel? It's a medication  He looked into for immunocompromised patients  I told him you may want to have a virtual appointment with him to discuss is this could be an option

## 2021-09-07 NOTE — TELEPHONE ENCOUNTER
Spoke with patient he was tested at [facility] in [place] for COVID was +  Spoke with him Friday and advised him to seek emergency care if he was SOB or had bad symptoms  Today he states he has been monitoring his symptoms and they are okay  I advised again that if his symptoms worsen to seek emergency care and he agreed

## 2021-09-07 NOTE — TELEPHONE ENCOUNTER
Please check into this  Advised we do not see any evidence that he was tested for COVID or that he is COVID positive  Also advised that we recommended he go to the emergency room on Friday to if he is a candidate  for the immunoglobulin therapy as of now may be too late to get it it must be done within 72 hours of a positive test  That is the reason we recommend he go to the ED

## 2021-09-10 DIAGNOSIS — E10.9 TYPE 1 DIABETES MELLITUS WITHOUT COMPLICATION (HCC): ICD-10-CM

## 2021-09-10 DIAGNOSIS — F41.9 ANXIETY: ICD-10-CM

## 2021-09-10 RX ORDER — CLONAZEPAM 0.5 MG/1
0.5 TABLET ORAL 2 TIMES DAILY PRN
Qty: 60 TABLET | Refills: 0 | Status: SHIPPED | OUTPATIENT
Start: 2021-09-10 | End: 2021-10-12 | Stop reason: SDUPTHER

## 2021-09-28 ENCOUNTER — TELEPHONE (OUTPATIENT)
Dept: OBGYN CLINIC | Facility: CLINIC | Age: 31
End: 2021-09-28

## 2021-10-12 DIAGNOSIS — E10.9 WELL CONTROLLED TYPE 1 DIABETES MELLITUS (HCC): Primary | ICD-10-CM

## 2021-10-12 DIAGNOSIS — F41.9 ANXIETY: ICD-10-CM

## 2021-10-15 RX ORDER — CLONAZEPAM 0.5 MG/1
0.5 TABLET ORAL 2 TIMES DAILY PRN
Qty: 60 TABLET | Refills: 0 | Status: SHIPPED | OUTPATIENT
Start: 2021-10-15 | End: 2022-02-11 | Stop reason: SDUPTHER

## 2021-11-10 ENCOUNTER — TELEPHONE (OUTPATIENT)
Dept: FAMILY MEDICINE CLINIC | Facility: CLINIC | Age: 31
End: 2021-11-10

## 2021-11-16 ENCOUNTER — TELEPHONE (OUTPATIENT)
Dept: FAMILY MEDICINE CLINIC | Facility: CLINIC | Age: 31
End: 2021-11-16

## 2021-11-22 ENCOUNTER — TELEPHONE (OUTPATIENT)
Dept: NEUROLOGY | Facility: CLINIC | Age: 31
End: 2021-11-22

## 2022-01-20 DIAGNOSIS — E10.65 TYPE 1 DIABETES MELLITUS WITH HYPERGLYCEMIA (HCC): ICD-10-CM

## 2022-01-24 DIAGNOSIS — E10.65 TYPE 1 DIABETES MELLITUS WITH HYPERGLYCEMIA (HCC): ICD-10-CM

## 2022-01-24 DIAGNOSIS — Z13.220 SCREENING FOR HYPERLIPIDEMIA: Primary | ICD-10-CM

## 2022-01-24 RX ORDER — BLOOD-GLUCOSE SENSOR
EACH MISCELLANEOUS
Qty: 1 EACH | Refills: 0 | Status: SHIPPED | OUTPATIENT
Start: 2022-01-24 | End: 2022-02-21 | Stop reason: SDUPTHER

## 2022-01-25 RX ORDER — BLOOD-GLUCOSE SENSOR
EACH MISCELLANEOUS
Qty: 3 EACH | Refills: 2 | OUTPATIENT
Start: 2022-01-25

## 2022-02-01 ENCOUNTER — TELEPHONE (OUTPATIENT)
Dept: ADMINISTRATIVE | Facility: OTHER | Age: 32
End: 2022-02-01

## 2022-02-01 ENCOUNTER — OFFICE VISIT (OUTPATIENT)
Dept: FAMILY MEDICINE CLINIC | Facility: CLINIC | Age: 32
End: 2022-02-01
Payer: COMMERCIAL

## 2022-02-01 VITALS
SYSTOLIC BLOOD PRESSURE: 136 MMHG | RESPIRATION RATE: 16 BRPM | DIASTOLIC BLOOD PRESSURE: 80 MMHG | WEIGHT: 188 LBS | HEIGHT: 72 IN | BODY MASS INDEX: 25.47 KG/M2 | HEART RATE: 66 BPM | TEMPERATURE: 98.2 F | OXYGEN SATURATION: 99 %

## 2022-02-01 DIAGNOSIS — Z00.00 ENCOUNTER FOR ROUTINE ADULT HEALTH EXAMINATION WITHOUT ABNORMAL FINDINGS: Primary | ICD-10-CM

## 2022-02-01 DIAGNOSIS — E10.65 TYPE 1 DIABETES MELLITUS WITH HYPERGLYCEMIA (HCC): ICD-10-CM

## 2022-02-01 DIAGNOSIS — F41.9 ANXIETY: ICD-10-CM

## 2022-02-01 DIAGNOSIS — Z11.59 NEED FOR HEPATITIS C SCREENING TEST: ICD-10-CM

## 2022-02-01 DIAGNOSIS — F32.0 CURRENT MILD EPISODE OF MAJOR DEPRESSIVE DISORDER, UNSPECIFIED WHETHER RECURRENT (HCC): ICD-10-CM

## 2022-02-01 PROCEDURE — 99395 PREV VISIT EST AGE 18-39: CPT | Performed by: NURSE PRACTITIONER

## 2022-02-01 RX ORDER — ESCITALOPRAM OXALATE 5 MG/1
5 TABLET ORAL DAILY
Qty: 30 TABLET | Refills: 1 | Status: SHIPPED | OUTPATIENT
Start: 2022-02-01 | End: 2022-04-07 | Stop reason: HOSPADM

## 2022-02-01 RX ORDER — BLOOD-GLUCOSE TRANSMITTER
1 EACH MISCELLANEOUS
Qty: 3 EACH | Refills: 5 | Status: SHIPPED | OUTPATIENT
Start: 2022-02-01

## 2022-02-01 NOTE — TELEPHONE ENCOUNTER
Upon review of the In Basket request and the patient's chart, initial outreach has been made via fax, please see Contacts section for details       Thank you  Debbie Mcfadden

## 2022-02-01 NOTE — LETTER
Diabetic Eye Exam Form    Date Requested: 22  Patient: Daren Second  Patient : 1990   Referring Provider: JEFFERSON Parikh    Dilated Retinal Exam, Optomap-Iris Exam, or Fundus Photography Done         Yes ([place] one above)         No     Date of Diabetic Eye Exam ______________________________  Left Eye      Exam did show retinopathy    Exam did not show retinopathy         Mild       Moderate       None       Proliferative       Severe     Right Eye     Exam did show retinopathy    Exam did not show retinopathy         Mild       Moderate       None       Proliferative       Severe     Comments __________________________________________________________    Practice Providing Exam ______________________________________________    Exam Performed By (print name) _______________________________________      Provider Signature ___________________________________________________      These reports are needed for  compliance  Please fax this completed form and a copy of the Diabetic Eye Exam report to our office located at [place] [address] as soon as possible via [phone] yu [place]: Phone [phone]    We thank you for your assistance in treating our mutual patient

## 2022-02-01 NOTE — TELEPHONE ENCOUNTER
Upon review of the In Basket request we were able to locate, review, and update the patient chart as requested for Diabetic Eye Exam 11/13/19 last visit    Any additional questions or concerns should be emailed to the Practice Liaisons via [email or web address] email, please do not reply via In Basket      Thank you  Patrick Bradshaw

## 2022-02-01 NOTE — PROGRESS NOTES
Assessment/Plan:  Adult health physical   Patient is a 53-year-old male with type 1 diabetes since the age of 10  Physical assessment was unremarkable today  Vital signs are well within normal limits  Patient is past due for routine labs did advised him of this  Anxiety and depression  Patient has been under my care for anxiety now the anxiety has progressed to some slight depression patient has been under therapy and previous years arm also has been on antidepressant which was well tolerated unsure which med it was  Patient has been under lot of stress recently is interested in starting any medication today  He did not 9 any thought of harming himself or harming anyone else  Did advise will start Lexapro 5 mg to be taken daily in the a m  [employer] [place] with a refill were given I did advise like to see back in the office in 2-3 weeks for follow-up to discuss how he is tolerating this medication clonazepam is not refilled at this time  Type 1 diabetes  Stable  Patient does very well with his diabetes arm a he currently uses an automated system of insulin delivery [employer] pump to deliver Humalog I prescribed dosages has not had lows which required hospitalization in over a year  Patient has been using the constant glucose monitor arm the Dexcom G6 refills were given today also refills were given for the [employer] pump  Patient has an of Humalog no refills were given at this time  Patient is advised to double check his sugars at least periodically to QC the she has 6 patient states that he does  Routine labs were placed in his chart is complete those is earliest convenience did advise him of this also advised would like to see him back in the office to discuss how he is tolerating the new SSRI in 2-3 weeks  All questions are answered patient verbalizes very happy with the outcome of today's visit he is very happy to be a part of this practice    Dosing all possible side effects of the prescribed medications or medications that had been prescribed in the past were reviewed and all questions were answered  Patient verbalized agreement and understanding of the plan of care as outlined during the office visit today return to office as indicated or sooner if a problem arises  Problem List Items Addressed This Visit     None      Visit Diagnoses     Encounter for routine adult health examination without abnormal findings    -  Primary    Need for hepatitis C screening test        Relevant Orders    Hepatitis C Antibody ([facility])    Anxiety        Relevant Medications    escitalopram (LEXAPRO) 5 mg tablet    Other Relevant Orders    Ambulatory Referral to Psychology    Current mild episode of major depressive disorder, unspecified whether recurrent (HCC)        Relevant Medications    escitalopram (LEXAPRO) 5 mg tablet    Other Relevant Orders    Ambulatory Referral to Psychology    Type 1 diabetes mellitus with hyperglycemia (HCC)        Relevant Medications    Continuous Blood Gluc Transmit (Dexcom G6 Transmitter) MISC    Other Relevant Orders    Hemoglobin A1C ([facility])    Comprehensive metabolic panel            Subjective:      Patient ID: Kory Solis is a 36 y o  male  Patient is here for routine follow-up  To review most recent labs  To also discuss current state of health and any new problems that they may be experiencing  Patient states that medications taken as prescribed and very well tolerated no new complaints at this time concerning his medication  Patient does states that he feels that he has an increased anxiety and depression would like to discuss at the office visit today  He denies any thoughts of some self-harm or harming anyone else          The following portions of the patient's history were reviewed and updated as appropriate: allergies, current medications, past family history, past medical history, past social history, past surgical history and problem list     Review of Systems   Constitutional: Negative for appetite change and fever  HENT: Negative for congestion and trouble swallowing  Respiratory: Negative for shortness of breath  Cardiovascular: Negative for chest pain  Gastrointestinal: Negative for abdominal pain  Genitourinary: Negative for difficulty urinating  Musculoskeletal: Negative for myalgias  Neurological: Negative for dizziness  Psychiatric/Behavioral: The patient is not nervous/anxious  Objective:      /80 (BP Location: Left arm, Patient Position: Sitting, Cuff Size: Standard)   Pulse 66   Temp 98 2 °F (36 8 °C) (Temporal)   Resp 16   Ht 5' 11 5" (1 816 m)   Wt 85 3 kg (188 lb)   SpO2 99%   BMI 25 86 kg/m²          Physical Exam  Vitals and nursing note reviewed  Constitutional:       General: He is not in acute distress  Appearance: He is well-developed  HENT:      Head: Normocephalic  Right Ear: External ear normal       Left Ear: External ear normal       Mouth/Throat:      Pharynx: Oropharynx is clear  Eyes:      Pupils: Pupils are equal, round, and reactive to light  Cardiovascular:      Rate and Rhythm: Normal rate and regular rhythm  Heart sounds: Normal heart sounds  Pulmonary:      Effort: Pulmonary effort is normal       Breath sounds: Normal breath sounds  Abdominal:      Palpations: Abdomen is soft  Musculoskeletal:         General: Normal range of motion  Cervical back: Normal range of motion  Skin:     General: Skin is warm and dry  Neurological:      General: No focal deficit present  Mental Status: He is alert and oriented to person, place, and time  Psychiatric:         Behavior: Behavior normal          Thought Content:  Thought content normal          Judgment: Judgment normal

## 2022-02-01 NOTE — TELEPHONE ENCOUNTER
----- Message from [address] sent at 2/1/2022 10:09 AM EST -----  Regarding: diabetic eye exam  02/01/22 10:09 AM    Hello, our patient attached above has had Diabetic Eye Exam completed/performed  Please assist in updating the patient chart by making an External outreach to [facility] located in [facility]  The date of service is 10/2021      Thank you,  Karol Higuera PG [facility]

## 2022-02-07 ENCOUNTER — TELEPHONE (OUTPATIENT)
Dept: FAMILY MEDICINE CLINIC | Facility: CLINIC | Age: 32
End: 2022-02-07

## 2022-02-07 NOTE — TELEPHONE ENCOUNTER
Patient is having side effects that he would like to speak with Vipul Nava or dayne about  He stopped taking the Lexapro due to the side effects  He's also concerned that he went off of clonazepam too soon

## 2022-02-08 ENCOUNTER — TELEPHONE (OUTPATIENT)
Dept: PSYCHIATRY | Facility: CLINIC | Age: 32
End: 2022-02-08

## 2022-02-08 NOTE — TELEPHONE ENCOUNTER
Reached out to psychiatry they gave [facility] a call already  They do not have apts available and they have a big wait list  He is in the works of getting an apt with a therapist of his choice  I did offer him the inpatient method if he is having suicidal thoughts, he did deny any at this time  He does believe it was a medication reaction  I explained everything you told me and he moved his apt up to this week instead of next week he stated he will do his labs tomorrow and then I scheduled his virtual for Thursday

## 2022-02-08 NOTE — TELEPHONE ENCOUNTER
PT nurse called Karol,looking to schedule PT  Reached out to PT, placed PT on wait list  PT has a referral on file  Being placed on wait list for both

## 2022-02-08 NOTE — TELEPHONE ENCOUNTER
Spoke with patient he stopped taking the clonazepam like recommended and started taking the lexapro for about 5 days  He did stop it he states he was having waved of euphoria that would come and go then he would crash hard from it  He was having a lot of dark thoughts  He is no longer taking the lexapro and has found a few clonazepam that he had left over  He is starting to feel better now but it was very rough for him a lot of crying and missing work  He would just like to know what you recommend him doing with the medications and such going forward

## 2022-02-10 ENCOUNTER — TELEPHONE (OUTPATIENT)
Dept: FAMILY MEDICINE CLINIC | Facility: CLINIC | Age: 32
End: 2022-02-10

## 2022-02-10 NOTE — TELEPHONE ENCOUNTER
That is very unfortunate  I feel we have bent over backwards for Gloria Carter refilling medication for him even though he has not kept his office visits  He had been absent or canceled office visits over 6 times in the last 3 months  I discussed this with him    I certainly hope is happy with his new practice and wish him the best

## 2022-02-10 NOTE — TELEPHONE ENCOUNTER
Reached out to patient to discuss getting his labs done as he is well overdue for them  He was informed of this at his last office visit where his on going depression was not being controled with clonazepam at that visit he was informed that he was out of this medication for over 2 months so he has discontinued it himself  He was advised that lexapro would be a better fit for his symptoms  He took for 1 week then discontinued ama  Today he states that he is unhappy with our recommendations that he needs to be seen by psychiatry due to him stating he is having "dark thoughts, missing work and crying frequently " His next apt appears to be with [facility]  I did let him know anything we can do to help his transition  Please note patient stated that he is currently taking his mothers Clonazepam as he has been out of this medication since 11/15/2021 according to our records

## 2022-02-10 NOTE — TELEPHONE ENCOUNTER
Noted, all reasonable attempts have been made to provide proper care and attention to this patients medical needs  Patient has been non-compliant and has gone against medical advise

## 2022-02-11 ENCOUNTER — OFFICE VISIT (OUTPATIENT)
Dept: FAMILY MEDICINE CLINIC | Facility: CLINIC | Age: 32
End: 2022-02-11
Payer: COMMERCIAL

## 2022-02-11 VITALS
OXYGEN SATURATION: 98 % | SYSTOLIC BLOOD PRESSURE: 130 MMHG | HEART RATE: 76 BPM | WEIGHT: 185 LBS | HEIGHT: 72 IN | BODY MASS INDEX: 25.06 KG/M2 | DIASTOLIC BLOOD PRESSURE: 80 MMHG

## 2022-02-11 DIAGNOSIS — F41.9 ANXIETY: ICD-10-CM

## 2022-02-11 DIAGNOSIS — F33.9 RECURRENT DEPRESSION (HCC): ICD-10-CM

## 2022-02-11 DIAGNOSIS — E10.65 TYPE 1 DIABETES MELLITUS WITH HYPERGLYCEMIA (HCC): Primary | ICD-10-CM

## 2022-02-11 PROBLEM — F41.0 PANIC ATTACK: Status: ACTIVE | Noted: 2022-02-11

## 2022-02-11 LAB — SL AMB POCT HEMOGLOBIN AIC: 6.8 (ref ?–6.5)

## 2022-02-11 PROCEDURE — 3044F HG A1C LEVEL LT 7.0%: CPT | Performed by: NURSE PRACTITIONER

## 2022-02-11 PROCEDURE — 3008F BODY MASS INDEX DOCD: CPT | Performed by: NURSE PRACTITIONER

## 2022-02-11 PROCEDURE — 99214 OFFICE O/P EST MOD 30 MIN: CPT | Performed by: NURSE PRACTITIONER

## 2022-02-11 PROCEDURE — 83036 HEMOGLOBIN GLYCOSYLATED A1C: CPT | Performed by: NURSE PRACTITIONER

## 2022-02-11 PROCEDURE — 1036F TOBACCO NON-USER: CPT | Performed by: NURSE PRACTITIONER

## 2022-02-11 PROCEDURE — 3075F SYST BP GE 130 - 139MM HG: CPT | Performed by: NURSE PRACTITIONER

## 2022-02-11 PROCEDURE — 3079F DIAST BP 80-89 MM HG: CPT | Performed by: NURSE PRACTITIONER

## 2022-02-11 RX ORDER — CLONAZEPAM 0.5 MG/1
0.5 TABLET ORAL DAILY PRN
Qty: 45 TABLET | Refills: 0 | Status: SHIPPED | OUTPATIENT
Start: 2022-02-11 | End: 2022-03-21

## 2022-02-11 NOTE — ASSESSMENT & PLAN NOTE
This medication is not to be stopped abruptly, monitor use over the next month, did explain this medication is not for long term use also, more prn    He is interested in the medical marijuana card, will pursue

## 2022-02-11 NOTE — ASSESSMENT & PLAN NOTE
Lab Results   Component Value Date    HGBA1C 6 8 (A) 02/11/2022   AIC within good range, he has been monitoring his sugars tightly, on pump and has dexcom  Does have routine eye exams done  Has seen endocrinology in the past, is very knowledgable     diabetes stable

## 2022-02-11 NOTE — PROGRESS NOTES
Diabetic Foot Exam    Patient's shoes and socks removed  Right Foot/Ankle   Right Foot Inspection  Skin Exam: skin normal and skin intact  No dry skin, no warmth, no callus, no erythema, no maceration, no abnormal color, no pre-ulcer, no ulcer and no callus  Toe Exam: ROM and strength within normal limits  Sensory   Vibration: intact  Proprioception: intact  Monofilament testing: intact    Vascular  Capillary refills: < 3 seconds  The right DP pulse is 1+  Left Foot/Ankle  Left Foot Inspection  Skin Exam: skin normal and skin intact  No dry skin, no warmth, no erythema, no maceration, normal color, no pre-ulcer, no ulcer and no callus  Toe Exam: ROM and strength within normal limits  Sensory   Vibration: intact  Proprioception: intact  Monofilament testing: intact    Vascular  Capillary refills: < 3 seconds  The left DP pulse is 1+  Assign Risk Category  No deformity present  No loss of protective sensation  No weak pulses  Risk: 0       BMI Counseling: Body mass index is 25 44 kg/m²  The BMI is above normal  Nutrition recommendations include reducing portion sizes  Exercise recommendations include moderate aerobic physical activity for 150 minutes/week  OFFICE VISIT  Halina Oppenheim Roche 36 y o  male MRN: [id number]          Assessment / Plan:  Problem List Items Addressed This Visit        Endocrine    Type 1 diabetes mellitus with hyperglycemia ([facility] ) - Primary       Lab Results   Component Value Date    HGBA1C 6 8 (A) 02/11/2022   AIC within good range, he has been monitoring his sugars tightly, on pump and has dexcom  Does have routine eye exams done  Has seen endocrinology in the past, is very knowledgable     diabetes stable          Relevant Orders    POCT hemoglobin A1c (Completed)    CBC and differential    Comprehensive metabolic panel    TSH, 3rd generation with Free T4 reflex    Lipid Panel with Direct LDL reflex    Hemoglobin A1C       Other    Recurrent depression ([facility] )     He is Working on getting a new therapist, [facility] card given, no longer taking SSRI, unwarranted SE          Anxiety     This medication is not to be stopped abruptly, monitor use over the next month, did explain this medication is not for long term use also, more prn   He is interested in the medical marijuana card, will pursue          Relevant Medications    clonazePAM (KlonoPIN) 0 5 mg tablet            Reason For Visit / Chief Complaint  Chief Complaint   Patient presents with    [facility]    Diabetes        HPI:  Gareth Goodwin is a 36 y o  male who presents today to [facility]  He has know type 1 diabetes  He is on insulin pump, aic 6 8 today  Frustrated with last PCP, was started on lexapro, was not weaned off clonazepam      Historical Information   History reviewed  No pertinent past medical history    Past Surgical History:   Procedure Laterality Date    ANKLE FRACTURE SURGERY Left      Social History   Social History     Substance and Sexual Activity   Alcohol Use Yes    Comment: occasional     Social History     Substance and Sexual Activity   Drug Use Yes    Types: Marijuana     Social History     Tobacco Use   Smoking Status Former Smoker    Packs/day: 1 00    Years: 10 00    Pack years: 10 00    Types: Cigarettes    Start date:     Quit date:     Years since quittin 1   Smokeless Tobacco Never Used     Family History   Problem Relation Age of Onset    Depression Mother     Fibromyalgia Mother     Anxiety disorder Mother     Hypertension Father     Allergies Brother        Meds/Allergies   Allergies   Allergen Reactions    Grass Pollen(K-O-R-T-Swt James) Cough and Sneezing       Meds:    Current Outpatient Medications:     clonazePAM (KlonoPIN) 0 5 mg tablet, Take 1 tablet (0 5 mg total) by mouth daily as needed for anxiety, Disp: 45 tablet, Rfl: 0    Continuous Blood Gluc Sensor (Dexcom G6 Sensor) MISC, Use subcutaneously every 10 days as directed, Disp: 1 each, Rfl: 0   Continuous Blood Gluc Transmit (Dexcom G6 Transmitter) MISC, Inject 1 Units under the skin every 6 (six) months, Disp: 3 each, Rfl: 5    HumaLOG 100 UNIT/ML injection, Inject 10 Units under the skin 3 (three) times a day with meals, Disp: 80 mL, Rfl: 5    Accu-Chek Softclix Lancets lancets, use to check sugars  DX IDDM  (Patient not taking: Reported on 7/22/2021), Disp: , Rfl:     escitalopram (LEXAPRO) 5 mg tablet, Take 1 tablet (5 mg total) by mouth daily (Patient not taking: Reported on 2/11/2022 ), Disp: 30 tablet, Rfl: 1    glucose blood test strip, 1 each by Other route 4 (four) times a day Use as instructed (Patient not taking: Reported on 2/1/2022 ), Disp: 200 each, Rfl: 6      REVIEW OF SYSTEMS  Review of Systems   Constitutional: Negative for activity change, chills, fatigue and fever  HENT: Negative for congestion, ear discharge, ear pain, sinus pressure, sinus pain, sore throat, tinnitus and trouble swallowing  Eyes: Negative for photophobia, pain, discharge, itching and visual disturbance  Respiratory: Negative for cough, chest tightness, shortness of breath and wheezing  Cardiovascular: Negative for chest pain and leg swelling  Gastrointestinal: Negative for abdominal distention, abdominal pain, constipation, diarrhea, nausea and vomiting  Endocrine: Negative for polydipsia, polyphagia and polyuria  Genitourinary: Negative for dysuria and frequency  Musculoskeletal: Negative for arthralgias, myalgias, neck pain and neck stiffness  Skin: Negative for color change  Neurological: Negative for dizziness, syncope, weakness, numbness and headaches  Hematological: Does not bruise/bleed easily  Psychiatric/Behavioral: Negative for behavioral problems, confusion, self-injury, sleep disturbance and suicidal ideas  The patient is nervous/anxious              Current Vitals:   Blood Pressure: 130/80 (02/11/22 1202)  Pulse: 76 (02/11/22 1202)  Height: 5' 11 5" (181 6 cm) (02/11/22 1202)  Weight - Scale: 83 9 kg (185 lb) (02/11/22 1202)  SpO2: 98 % (02/11/22 1202)  [unfilled]    PHYSICAL EXAMS:  Physical Exam  Vitals and nursing note reviewed  Constitutional:       Appearance: Normal appearance  Cardiovascular:      Pulses: no weak pulses          Dorsalis pedis pulses are 1+ on the right side and 1+ on the left side  Pulmonary:      Effort: Pulmonary effort is normal    Feet:      Right foot:      Skin integrity: No ulcer, skin breakdown, erythema, warmth, callus or dry skin  Left foot:      Skin integrity: No ulcer, skin breakdown, erythema, warmth, callus or dry skin  Neurological:      General: No focal deficit present  Mental Status: He is alert and oriented to person, place, and time  Psychiatric:         Mood and Affect: Mood normal          Behavior: Behavior normal              Lab, imaging and other studies: I have personally reviewed pertinent reports  Shannan Republic

## 2022-02-11 NOTE — ASSESSMENT & PLAN NOTE
He is Working on getting a new therapist, [employer] card given, no longer taking SSRI, unwarranted SE

## 2022-02-19 DIAGNOSIS — E10.65 TYPE 1 DIABETES MELLITUS WITH HYPERGLYCEMIA (HCC): ICD-10-CM

## 2022-02-21 DIAGNOSIS — E10.65 TYPE 1 DIABETES MELLITUS WITH HYPERGLYCEMIA (HCC): ICD-10-CM

## 2022-02-21 RX ORDER — BLOOD-GLUCOSE SENSOR
EACH MISCELLANEOUS
Qty: 1 EACH | Refills: 0 | Status: SHIPPED | OUTPATIENT
Start: 2022-02-21 | End: 2022-03-18

## 2022-02-21 RX ORDER — BLOOD-GLUCOSE SENSOR
EACH MISCELLANEOUS
Qty: 3 EACH | OUTPATIENT
Start: 2022-02-21

## 2022-03-17 DIAGNOSIS — E10.65 TYPE 1 DIABETES MELLITUS WITH HYPERGLYCEMIA (HCC): ICD-10-CM

## 2022-03-18 RX ORDER — BLOOD-GLUCOSE SENSOR
EACH MISCELLANEOUS
Qty: 3 EACH | Refills: 3 | Status: SHIPPED | OUTPATIENT
Start: 2022-03-18 | End: 2022-07-11 | Stop reason: SDUPTHER

## 2022-04-07 ENCOUNTER — OFFICE VISIT (OUTPATIENT)
Dept: FAMILY MEDICINE CLINIC | Facility: CLINIC | Age: 32
End: 2022-04-07
Payer: COMMERCIAL

## 2022-04-07 VITALS
HEART RATE: 92 BPM | TEMPERATURE: 100.5 F | SYSTOLIC BLOOD PRESSURE: 132 MMHG | HEIGHT: 72 IN | BODY MASS INDEX: 27.09 KG/M2 | OXYGEN SATURATION: 97 % | WEIGHT: 200 LBS | DIASTOLIC BLOOD PRESSURE: 80 MMHG

## 2022-04-07 DIAGNOSIS — J01.10 ACUTE NON-RECURRENT FRONTAL SINUSITIS: ICD-10-CM

## 2022-04-07 DIAGNOSIS — E10.65 TYPE 1 DIABETES MELLITUS WITH HYPERGLYCEMIA (HCC): Primary | ICD-10-CM

## 2022-04-07 DIAGNOSIS — F33.9 RECURRENT DEPRESSION (HCC): ICD-10-CM

## 2022-04-07 PROCEDURE — 99214 OFFICE O/P EST MOD 30 MIN: CPT | Performed by: NURSE PRACTITIONER

## 2022-04-07 PROCEDURE — 3725F SCREEN DEPRESSION PERFORMED: CPT | Performed by: NURSE PRACTITIONER

## 2022-04-07 PROCEDURE — 1036F TOBACCO NON-USER: CPT | Performed by: NURSE PRACTITIONER

## 2022-04-07 PROCEDURE — 3008F BODY MASS INDEX DOCD: CPT | Performed by: NURSE PRACTITIONER

## 2022-04-07 PROCEDURE — 3079F DIAST BP 80-89 MM HG: CPT | Performed by: NURSE PRACTITIONER

## 2022-04-07 PROCEDURE — 3075F SYST BP GE 130 - 139MM HG: CPT | Performed by: NURSE PRACTITIONER

## 2022-04-07 PROCEDURE — 87636 SARSCOV2 & INF A&B AMP PRB: CPT | Performed by: NURSE PRACTITIONER

## 2022-04-07 RX ORDER — AMOXICILLIN AND CLAVULANATE POTASSIUM 875; 125 MG/1; MG/1
1 TABLET, FILM COATED ORAL 2 TIMES DAILY
Qty: 14 TABLET | Refills: 0 | Status: SHIPPED | OUTPATIENT
Start: 2022-04-07 | End: 2022-04-14

## 2022-04-07 NOTE — PROGRESS NOTES
Depression Screening Follow-up Plan: Patient's depression screening was positive with a PHQ-2 score of   Their PHQ-9 score was 7  Patient assessed for underlying major depression  They have no active suicidal ideations  Brief counseling provided and recommend additional follow-up/re-evaluation next office visit  OFFICE VISIT  Hilario Sanchez 36 y o  male MRN: [id number]          Assessment / Plan:  Problem List Items Addressed This Visit        Endocrine    Type 1 diabetes mellitus with hyperglycemia ([facility] ) - Primary       Lab Results   Component Value Date    HGBA1C 6 8 (A) 02/11/2022   sugars have varied throughout acute illness  Is type 1 dm  Over last aic 6 8, sugars between , acute illness  He will remain monitoring his sugars and adjust insulin as needed          Relevant Orders    Ambulatory Referral to Ophthalmology       Respiratory    Acute non-recurrent frontal sinusitis     You have been prescribed an antibiotic  This medication is used to treat bacterial infections  Follow the directions as prescribed  Do not share this medication with anyone  Do not stop taking her medication until it is finished, even if you are feeling better  Taking medication with a full glass of water  Call the office if you experience any possible side effects  Wash hands frequently to prevent the spread of infection  Ibuprofen and/or tylenol as needed for pain or fever  If not improving over the next 7-10 days, call office            Relevant Medications    amoxicillin-clavulanate (AUGMENTIN) 875-125 mg per tablet    Other Relevant Orders    Covid/Flu- Office Collect       Other    Recurrent depression ([facility] )     Has weaned off lexapro, has weaned off clonazepam, stable at this time                 Reason For Visit / Chief Complaint  Chief Complaint   Patient presents with    Nasal Congestion     started sunday night,     Fever     sunday/monday    Generalized Body Aches    Diarrhea    Diabetes     blood sugars all over the place        HPI:  Kofi Aguirre is a 36 y o  male who presents today for acute sick visit  Fever, bodyaches, green mucous, burning sinus, fatigue  tmax 100 9  He has tried cold cough congestions medication, vit c, vid, zinc, hydration  He missed all week from work  Resides with girlfriend, not ill   He reports his sugars running from   He would like FMLA forms completed  Historical Information   History reviewed  No pertinent past medical history    Past Surgical History:   Procedure Laterality Date    ANKLE FRACTURE SURGERY Left      Social History   Social History     Substance and Sexual Activity   Alcohol Use Yes    Comment: occasional     Social History     Substance and Sexual Activity   Drug Use Yes    Types: Marijuana     Social History     Tobacco Use   Smoking Status Former Smoker    Packs/day: 1 00    Years: 10 00    Pack years: 10 00    Types: Cigarettes    Start date:     Quit date:     Years since quittin 2   Smokeless Tobacco Never Used     Family History   Problem Relation Age of Onset    Depression Mother     Fibromyalgia Mother     Anxiety disorder Mother     Hypertension Father     Allergies Brother        Meds/Allergies   Allergies   Allergen Reactions    Grass Pollen(K-O-R-T-Swt James) Cough and Sneezing       Meds:    Current Outpatient Medications:     clonazePAM (KlonoPIN) 0 5 mg tablet, TAKE 1 TABLET BY MOUTH EVERY DAY AS NEEDED FOR ANXIETY, Disp: 30 tablet, Rfl: 0    Continuous Blood Gluc Sensor (Dexcom G6 Sensor) MISC, USE SUBCUTANEOUSLY EVERY 10 DAYS AS DIRECTED, Disp: 3 each, Rfl: 3    Continuous Blood Gluc Transmit (Dexcom G6 Transmitter) MISC, Inject 1 Units under the skin every 6 (six) months, Disp: 3 each, Rfl: 5    HumaLOG 100 UNIT/ML injection, Inject 10 Units under the skin 3 (three) times a day with meals, Disp: 80 mL, Rfl: 5    amoxicillin-clavulanate (AUGMENTIN) 875-125 mg per tablet, Take 1 tablet by mouth 2 (two) times a day for 7 days, Disp: 14 tablet, Rfl: 0      REVIEW OF SYSTEMS  Review of Systems   Constitutional: Positive for activity change, appetite change, chills, fatigue and fever  HENT: Positive for congestion, sinus pressure and sinus pain  Negative for ear discharge, ear pain, sore throat, tinnitus and trouble swallowing  Eyes: Negative for photophobia, pain, discharge, itching and visual disturbance  Respiratory: Positive for cough  Negative for chest tightness, shortness of breath and wheezing  Cardiovascular: Negative for chest pain and leg swelling  Gastrointestinal: Negative for abdominal distention, abdominal pain, constipation, diarrhea, nausea and vomiting  Endocrine: Negative for polydipsia, polyphagia and polyuria  Genitourinary: Negative for dysuria and frequency  Musculoskeletal: Negative for arthralgias, myalgias, neck pain and neck stiffness  Skin: Negative for color change  Neurological: Negative for dizziness, syncope, weakness, numbness and headaches  Hematological: Does not bruise/bleed easily  Psychiatric/Behavioral: Negative for behavioral problems, confusion, self-injury, sleep disturbance and suicidal ideas  The patient is not nervous/anxious  Current Vitals:   Blood Pressure: 132/80 (04/07/22 1208)  Pulse: 92 (04/07/22 1208)  Temperature: 100 5 °F (38 1 °C) (04/07/22 1208)  Height: 5' 11 5" (181 6 cm) (04/07/22 1208)  Weight - Scale: 90 7 kg (200 lb) (04/07/22 1208)  SpO2: 97 % (04/07/22 1208)  [unfilled]    PHYSICAL EXAMS:  Physical Exam  Vitals and nursing note reviewed  Constitutional:       Appearance: Normal appearance  He is ill-appearing  HENT:      Head: Normocephalic and atraumatic  Right Ear: Tympanic membrane normal       Left Ear: Tympanic membrane normal       Nose: Congestion and rhinorrhea present  Mouth/Throat:      Mouth: Mucous membranes are moist       Pharynx: No oropharyngeal exudate or posterior oropharyngeal erythema  Cardiovascular:      Rate and Rhythm: Normal rate and regular rhythm  Pulses: Normal pulses  Heart sounds: Normal heart sounds  Pulmonary:      Effort: Pulmonary effort is normal    Musculoskeletal:      Cervical back: Normal range of motion and neck supple  Skin:     General: Skin is warm  Neurological:      General: No focal deficit present  Mental Status: He is alert and oriented to person, place, and time  Psychiatric:         Mood and Affect: Mood normal          Behavior: Behavior normal              Lab, imaging and other studies: I have personally reviewed pertinent reports  Álvaro Pozo

## 2022-04-07 NOTE — ASSESSMENT & PLAN NOTE
Lab Results   Component Value Date    HGBA1C 6 8 (A) 02/11/2022   sugars have varied throughout acute illness  Is type 1 dm  Over last aic 6 8, sugars between , acute illness   He will remain monitoring his sugars and adjust insulin as needed

## 2022-04-07 NOTE — ASSESSMENT & PLAN NOTE
You have been prescribed an antibiotic  This medication is used to treat bacterial infections  Follow the directions as prescribed  Do not share this medication with anyone  Do not stop taking her medication until it is finished, even if you are feeling better  Taking medication with a full glass of water  Call the office if you experience any possible side effects  Wash hands frequently to prevent the spread of infection  Ibuprofen and/or tylenol as needed for pain or fever  If not improving over the next 7-10 days, call office

## 2022-04-08 LAB
FLUAV RNA RESP QL NAA+PROBE: POSITIVE
FLUBV RNA RESP QL NAA+PROBE: NEGATIVE
SARS-COV-2 RNA RESP QL NAA+PROBE: NEGATIVE

## 2022-04-08 NOTE — RESULT ENCOUNTER NOTE
Please let him know he is + for influenza A  Let me know what he wants to do about work, he is out of the window for antivirals

## 2022-07-11 ENCOUNTER — TELEPHONE (OUTPATIENT)
Dept: FAMILY MEDICINE CLINIC | Facility: CLINIC | Age: 32
End: 2022-07-11

## 2022-07-11 DIAGNOSIS — E10.65 TYPE 1 DIABETES MELLITUS WITH HYPERGLYCEMIA (HCC): ICD-10-CM

## 2022-07-11 RX ORDER — BLOOD-GLUCOSE SENSOR
EACH MISCELLANEOUS
Qty: 3 EACH | Refills: 0 | Status: SHIPPED | OUTPATIENT
Start: 2022-07-11 | End: 2022-10-11 | Stop reason: SDUPTHER

## 2022-07-14 DIAGNOSIS — E10.65 TYPE 1 DIABETES MELLITUS WITH HYPERGLYCEMIA (HCC): Primary | ICD-10-CM

## 2022-08-08 DIAGNOSIS — E10.65 TYPE 1 DIABETES MELLITUS WITH HYPERGLYCEMIA (HCC): Primary | ICD-10-CM

## 2022-08-08 PROCEDURE — 4010F ACE/ARB THERAPY RXD/TAKEN: CPT | Performed by: NURSE PRACTITIONER

## 2022-08-08 RX ORDER — LISINOPRIL 2.5 MG/1
2.5 TABLET ORAL DAILY
Qty: 30 TABLET | Refills: 5 | Status: SHIPPED | OUTPATIENT
Start: 2022-08-08

## 2022-08-15 ENCOUNTER — OFFICE VISIT (OUTPATIENT)
Dept: FAMILY MEDICINE CLINIC | Facility: CLINIC | Age: 32
End: 2022-08-15
Payer: COMMERCIAL

## 2022-08-15 VITALS
HEART RATE: 84 BPM | TEMPERATURE: 96.8 F | DIASTOLIC BLOOD PRESSURE: 80 MMHG | OXYGEN SATURATION: 97 % | HEIGHT: 72 IN | WEIGHT: 199.2 LBS | SYSTOLIC BLOOD PRESSURE: 136 MMHG | BODY MASS INDEX: 26.98 KG/M2

## 2022-08-15 DIAGNOSIS — F33.9 RECURRENT DEPRESSION (HCC): ICD-10-CM

## 2022-08-15 DIAGNOSIS — E10.65 TYPE 1 DIABETES MELLITUS WITH HYPERGLYCEMIA (HCC): Primary | ICD-10-CM

## 2022-08-15 DIAGNOSIS — F41.9 ANXIETY: ICD-10-CM

## 2022-08-15 DIAGNOSIS — R41.840 ATTENTION DEFICIT: ICD-10-CM

## 2022-08-15 PROBLEM — J01.10 ACUTE NON-RECURRENT FRONTAL SINUSITIS: Status: RESOLVED | Noted: 2022-04-07 | Resolved: 2022-08-15

## 2022-08-15 PROCEDURE — 3075F SYST BP GE 130 - 139MM HG: CPT | Performed by: NURSE PRACTITIONER

## 2022-08-15 PROCEDURE — 3079F DIAST BP 80-89 MM HG: CPT | Performed by: NURSE PRACTITIONER

## 2022-08-15 PROCEDURE — 99214 OFFICE O/P EST MOD 30 MIN: CPT | Performed by: NURSE PRACTITIONER

## 2022-08-15 RX ORDER — ATOMOXETINE 10 MG/1
10 CAPSULE ORAL DAILY
Qty: 30 CAPSULE | Refills: 1 | Status: SHIPPED | OUTPATIENT
Start: 2022-08-15 | End: 2022-09-15

## 2022-08-15 RX ORDER — CLONAZEPAM 0.5 MG/1
0.5 TABLET ORAL DAILY PRN
Qty: 30 TABLET | Refills: 0 | Status: SHIPPED | OUTPATIENT
Start: 2022-08-15

## 2022-08-15 NOTE — PROGRESS NOTES
OFFICE VISIT  Diana Sanchez 36 y o  male MRN: [id number]          Assessment / Plan:  Problem List Items Addressed This Visit        Endocrine    Type 1 diabetes mellitus with hyperglycemia ([facility] ) - Primary       Lab Results   Component Value Date    HGBA1C 6 8 (A) 02/11/2022   to complete his labs, reprinted today  Sugars up and down with summer months  Relevant Orders    Microalbumin / creatinine urine ratio       Other    Recurrent depression ([facility] )     Stable at this time  Off lexapro          Relevant Medications    atomoxetine (STRATTERA) 10 MG capsule    Anxiety     intermittent flare ups, using clonazepam infrequently          Relevant Medications    clonazePAM (KlonoPIN) 0 5 mg tablet    Attention deficit     Will trial low dose follow up in 4 weeks for evaluation  Relevant Medications    atomoxetine (STRATTERA) 10 MG capsule            Reason For Visit / Chief Complaint  Chief Complaint   Patient presents with    Follow-up     Pt reports to the office for his 2 month f/u apt  Pt stated he would like to speak to you can some mental health issues  HPI:  Nba Ravi is a 36 y o  male who presents today for routine follow up  He reports a lot of stress  He has known type 1 diabetes, reports getting tiresome   He has been trying to get into therapy  Has not been able to secure appt  He reports a lot of forgetfulness, unable to stay organized  He reports being over stimulated,not new, has been ongoing for years, high stress job      Historical Information   History reviewed  No pertinent past medical history    Past Surgical History:   Procedure Laterality Date    ANKLE FRACTURE SURGERY Left      Social History   Social History     Substance and Sexual Activity   Alcohol Use Yes    Comment: occasional     Social History     Substance and Sexual Activity   Drug Use Yes    Types: Marijuana     Social History     Tobacco Use   Smoking Status Former Smoker    Packs/day: 1 00    Years: 10 00    Pack years: 10 00    Types: Cigarettes    Start date:     Quit date:     Years since quittin 6   Smokeless Tobacco Never Used     Family History   Problem Relation Age of Onset    Depression Mother     Fibromyalgia Mother     Anxiety disorder Mother     Hypertension Father     Allergies Brother        Meds/Allergies   Allergies   Allergen Reactions    Grass Pollen(K-O-R-T-Swt James) Cough and Sneezing       Meds:    Current Outpatient Medications:     atomoxetine (STRATTERA) 10 MG capsule, Take 1 capsule (10 mg total) by mouth daily, Disp: 30 capsule, Rfl: 1    clonazePAM (KlonoPIN) 0 5 mg tablet, Take 1 tablet (0 5 mg total) by mouth daily as needed for anxiety, Disp: 30 tablet, Rfl: 0    Continuous Blood Gluc Sensor (Dexcom G6 Sensor) MISC, Test blood sugar as directed, Disp: 3 each, Rfl: 0    Continuous Blood Gluc Transmit (Dexcom G6 Transmitter) MISC, Inject 1 Units under the skin every 6 (six) months, Disp: 3 each, Rfl: 5    HumaLOG 100 UNIT/ML injection, Inject 10 Units under the skin 3 (three) times a day with meals, Disp: 80 mL, Rfl: 5    lisinopril (ZESTRIL) 2 5 mg tablet, Take 1 tablet (2 5 mg total) by mouth daily, Disp: 30 tablet, Rfl: 5      REVIEW OF SYSTEMS  Review of Systems   Constitutional: Negative for activity change, chills, fatigue and fever  HENT: Negative for congestion, ear discharge, ear pain, sinus pressure, sinus pain, sore throat, tinnitus and trouble swallowing  Eyes: Negative for photophobia, pain, discharge, itching and visual disturbance  Respiratory: Negative for cough, chest tightness, shortness of breath and wheezing  Cardiovascular: Negative for chest pain, palpitations and leg swelling  Gastrointestinal: Negative for abdominal distention, abdominal pain, constipation, diarrhea, nausea and vomiting  Endocrine: Negative for polydipsia, polyphagia and polyuria  Genitourinary: Negative for dysuria, frequency and hematuria  Musculoskeletal: Negative for arthralgias, back pain, myalgias, neck pain and neck stiffness  Skin: Negative for color change and rash  Neurological: Negative for dizziness, seizures, syncope, weakness, numbness and headaches  Hematological: Does not bruise/bleed easily  Psychiatric/Behavioral: Positive for decreased concentration and sleep disturbance  Negative for behavioral problems, confusion, self-injury and suicidal ideas  The patient is not nervous/anxious  All other systems reviewed and are negative  Current Vitals:   Blood Pressure: 136/80 (08/15/22 1300)  Pulse: 84 (08/15/22 1300)  Temperature: (!) 96 8 °F (36 °C) (08/15/22 1300)  Height: 5' 11 5" (181 6 cm) (08/15/22 1300)  Weight - Scale: 90 4 kg (199 lb 3 2 oz) (08/15/22 1300)  SpO2: 97 % (08/15/22 1300)  [unfilled]    PHYSICAL EXAMS:  Physical Exam  Vitals and nursing note reviewed  Constitutional:       Appearance: Normal appearance  HENT:      Head: Normocephalic and atraumatic  Cardiovascular:      Rate and Rhythm: Normal rate and regular rhythm  Pulmonary:      Effort: Pulmonary effort is normal       Breath sounds: Normal breath sounds  Musculoskeletal:         General: No swelling  Skin:     General: Skin is warm  Neurological:      General: No focal deficit present  Mental Status: He is alert and oriented to person, place, and time  Psychiatric:         Mood and Affect: Mood normal          Behavior: Behavior normal              Lab, imaging and other studies: I have personally reviewed pertinent reports  Gabriel Borges

## 2022-08-15 NOTE — ASSESSMENT & PLAN NOTE
Lab Results   Component Value Date    HGBA1C 6 8 (A) 02/11/2022   to complete his labs, reprinted today  Sugars up and down with summer months

## 2022-08-15 NOTE — ASSESSMENT & PLAN NOTE
Wrist Sprain  A sprain is an injury to the ligaments or capsule that holds a joint together. There are no broken bones. Most sprains take about 3 to 6 weeks to heal. If it a severe sprain where the ligament is completely torn, it can take months to recover.    Most wrist sprains are treated with a splint, wrist brace, or elastic wrap for support. Severe sprains may require surgery.  Home care  · Keep your arm elevated to reduce pain and swelling. This is very important during the first 48 hours.  · Apply an ice pack over the injured area for 15 to 20 minutes every 3 to 6 hours. You should do this for the first 24 to 48 hours. You can make an ice pack by filling a plastic bag that seals at the top with ice cubes and then wrapping it with a thin towel. Continue to use ice packs for relief of pain and swelling as needed. As the ice melts, be careful to avoid getting your wrap, splint, or cast wet. After 48 hours, apply heat (warm shower or warm bath) for 15 to 20 minutes several times a day, or alternate ice and heat.   · You may use over-the-counter pain medicine to control pain, unless another pain medicine was prescribed. If you have chronic liver or kidney disease or ever had a stomach ulcer or GI bleeding, talk with your doctor before using these medicines.  · If you were given a splint or brace, wear it for the time advised by your doctor.  Follow-up care  Follow up with your healthcare provider as advised. Any X-rays you had today don’t show any broken bones, breaks, or fractures. Sometimes fractures don’t show up on the first X-ray. Bruises and sprains can sometimes hurt as much as a fracture. These injuries can take time to heal completely. If your symptoms don’t improve or they get worse, talk with your doctor. You may need a repeat X-ray. If X-rays were taken, you will be told of any new findings that may affect your care.  When to seek medical advice  Call your healthcare provider right away if any of  Will trial low dose follow up in 4 weeks for evaluation  these occur:  · Pain or swelling increases  · Fingers or hand becomes cold, blue, numb, or tingly  © 2000-2015 [employer]. [address] [place] [address], [place] [address]. All rights reserved. This information is not intended as a substitute for professional medical care. Always follow your healthcare professional's instructions.        Treating Ankle Sprains  Treatment will depend on how bad your sprain is. For a severe sprain, healing may take 3 months or more.  Right after your injury: Use R.I.C.E.  · Rest: At first, keep weight off the ankle as much as you can. You may be given crutches to help you walk without putting weight on the ankle.  · Ice: Put an ice pack on the ankle for 15 minutes. Remove the pack and wait at least 30 minutes. Repeat for up to 3 days. This helps reduce swelling.  · Compression: To reduce swelling and keep the joint stable, you may need to wrap the ankle with an elastic bandage. For more severe sprains, you may need an ankle brace or a cast.  · Elevation: To reduce swelling, keep your ankle raised above your heart when you sit or lie down.  Medicine  Your healthcare provider may suggest oral non-steroidal anti-inflammatory medicine (NSAIDs), such as ibuprofen. This relieves the pain and helps reduce any swelling. Be sure to take your medicine as directed.  Contrast baths  After 3 days, soak your ankle in warm water for 30 seconds, then in cool water for 30 seconds. Go back and forth for 5 minutes. Doing this every 2 hours will help keep the swelling down.  Exercises    After about 2 to 3 weeks, you may be given exercises to strengthen the ligaments and muscles in the ankle. Doing these exercises will help prevent another ankle sprain. Exercises may include standing on your toes and then on your heels and doing ankle curls.  Ankle curls  · Sit on the edge of a sturdy table or lie on your back.  · Pull your toes toward you. Then point them away from you. Repeat for 2 to 3  minutes.   © 8854-6535 [employer]. [address] [place] [address], [place] [address]. All rights reserved. This information is not intended as a substitute for professional medical care. Always follow your healthcare professional's instructions.

## 2022-08-22 ENCOUNTER — TELEPHONE (OUTPATIENT)
Dept: PSYCHIATRY | Facility: CLINIC | Age: 32
End: 2022-08-22

## 2022-09-06 ENCOUNTER — TELEPHONE (OUTPATIENT)
Dept: PSYCHIATRY | Facility: CLINIC | Age: 32
End: 2022-09-06

## 2022-09-15 ENCOUNTER — OFFICE VISIT (OUTPATIENT)
Dept: FAMILY MEDICINE CLINIC | Facility: CLINIC | Age: 32
End: 2022-09-15
Payer: COMMERCIAL

## 2022-09-15 VITALS
WEIGHT: 208 LBS | HEART RATE: 84 BPM | SYSTOLIC BLOOD PRESSURE: 138 MMHG | HEIGHT: 72 IN | BODY MASS INDEX: 28.17 KG/M2 | TEMPERATURE: 97.1 F | OXYGEN SATURATION: 96 % | DIASTOLIC BLOOD PRESSURE: 80 MMHG

## 2022-09-15 DIAGNOSIS — F33.9 RECURRENT DEPRESSION (HCC): ICD-10-CM

## 2022-09-15 DIAGNOSIS — R41.840 ATTENTION DEFICIT: Primary | ICD-10-CM

## 2022-09-15 DIAGNOSIS — F41.9 ANXIETY: ICD-10-CM

## 2022-09-15 DIAGNOSIS — E10.65 TYPE 1 DIABETES MELLITUS WITH HYPERGLYCEMIA (HCC): ICD-10-CM

## 2022-09-15 PROCEDURE — 99214 OFFICE O/P EST MOD 30 MIN: CPT | Performed by: NURSE PRACTITIONER

## 2022-09-15 PROCEDURE — 3079F DIAST BP 80-89 MM HG: CPT | Performed by: NURSE PRACTITIONER

## 2022-09-15 PROCEDURE — 3075F SYST BP GE 130 - 139MM HG: CPT | Performed by: NURSE PRACTITIONER

## 2022-09-15 RX ORDER — ATOMOXETINE 25 MG/1
25 CAPSULE ORAL DAILY
Qty: 30 CAPSULE | Refills: 0 | Status: SHIPPED | OUTPATIENT
Start: 2022-09-15

## 2022-09-15 NOTE — ASSESSMENT & PLAN NOTE
He reports some improvement, increasing to Strattera 25 mg  He will message me through [employer] if not getting any improvement with medication, otherwise will follow-up at end of year

## 2022-09-15 NOTE — ASSESSMENT & PLAN NOTE
Lab Results   Component Value Date    BA1C 6 8 (A) 02/11/2022   He does report sugars being up and down, "anthony", please complete lab work

## 2022-09-15 NOTE — PROGRESS NOTES
OFFICE VISIT  Aliyah Sanchez 36 y o  male MRN: [id number]          Assessment / Plan:  Problem List Items Addressed This Visit        Endocrine    Type 1 diabetes mellitus with hyperglycemia ([facility] )       Lab Results   Component Value Date    HGBA1C 6 8 (A) 2022   He does report sugars being up and down, "anthony", please complete lab work            Other    Recurrent depression ([facility] )    Relevant Medications    atoMOXetine (STRATTERA) 25 mg capsule    Anxiety     Anxiety is stable  Has used twice since last month  Overall doing better         Attention deficit - Primary     He reports some improvement, increasing to Strattera 25 mg  He will message me through [employer] if not getting any improvement with medication, otherwise will follow-up at end of year  Relevant Medications    atoMOXetine (STRATTERA) 25 mg capsule            Reason For Visit / Chief Complaint  Chief Complaint   Patient presents with    Follow-up     Pt reports to the office for a one month f/u  HPI:  Gareth Goodwin is a 36 y o  male who reports today for one-month follow-up, recently started on Strattera  Historical Information   History reviewed  No pertinent past medical history    Past Surgical History:   Procedure Laterality Date    ANKLE FRACTURE SURGERY Left      Social History   Social History     Substance and Sexual Activity   Alcohol Use Yes    Comment: occasional     Social History     Substance and Sexual Activity   Drug Use Yes    Types: Marijuana     Social History     Tobacco Use   Smoking Status Former Smoker    Packs/day: 1 00    Years: 10 00    Pack years: 10 00    Types: Cigarettes    Start date:     Quit date: 2017    Years since quittin 7   Smokeless Tobacco Never Used     Family History   Problem Relation Age of Onset    Depression Mother     Fibromyalgia Mother     Anxiety disorder Mother     Hypertension Father     Allergies Brother        Meds/Allergies   Allergies   Allergen Reactions    Grass Pollen(K-O-R-T-Swt James) Cough and Sneezing       Meds:    Current Outpatient Medications:     atoMOXetine (STRATTERA) 25 mg capsule, Take 1 capsule (25 mg total) by mouth daily, Disp: 30 capsule, Rfl: 0    clonazePAM (KlonoPIN) 0 5 mg tablet, Take 1 tablet (0 5 mg total) by mouth daily as needed for anxiety, Disp: 30 tablet, Rfl: 0    Continuous Blood Gluc Sensor (Dexcom G6 Sensor) MISC, Test blood sugar as directed, Disp: 3 each, Rfl: 0    Continuous Blood Gluc Transmit (Dexcom G6 Transmitter) MISC, Inject 1 Units under the skin every 6 (six) months, Disp: 3 each, Rfl: 5    HumaLOG 100 UNIT/ML injection, Inject 10 Units under the skin 3 (three) times a day with meals, Disp: 80 mL, Rfl: 5    lisinopril (ZESTRIL) 2 5 mg tablet, Take 1 tablet (2 5 mg total) by mouth daily, Disp: 30 tablet, Rfl: 5      REVIEW OF SYSTEMS  Review of Systems   Constitutional: Negative for activity change, chills, fatigue and fever  HENT: Negative for congestion, ear discharge, ear pain, sinus pressure, sinus pain, sore throat, tinnitus and trouble swallowing  Eyes: Negative for photophobia, pain, discharge, itching and visual disturbance  Respiratory: Negative for cough, chest tightness, shortness of breath and wheezing  Cardiovascular: Negative for chest pain and leg swelling  Gastrointestinal: Negative for abdominal distention, abdominal pain, constipation, diarrhea, nausea and vomiting  Endocrine: Negative for polydipsia, polyphagia and polyuria  Genitourinary: Negative for dysuria and frequency  Musculoskeletal: Negative for arthralgias, myalgias, neck pain and neck stiffness  Skin: Negative for color change  Neurological: Negative for dizziness, syncope, weakness, numbness and headaches  Hematological: Does not bruise/bleed easily  Psychiatric/Behavioral: Negative for behavioral problems, confusion, self-injury, sleep disturbance and suicidal ideas   The patient is not nervous/anxious  Current Vitals:   Blood Pressure: 138/80 (09/15/22 0857)  Pulse: 84 (09/15/22 0857)  Temperature: (!) 97 1 °F (36 2 °C) (09/15/22 0857)  Height: 5' 11 5" (181 6 cm) (09/15/22 0857)  Weight - Scale: 94 3 kg (208 lb) (09/15/22 0857)  SpO2: 96 % (09/15/22 0857)  [unfilled]    PHYSICAL EXAMS:  Physical Exam  Vitals and nursing note reviewed  Constitutional:       Appearance: Normal appearance  He is well-developed  HENT:      Head: Normocephalic and atraumatic  Nose: Nose normal  No congestion or rhinorrhea  Mouth/Throat:      Mouth: Mucous membranes are moist       Pharynx: No oropharyngeal exudate or posterior oropharyngeal erythema  Eyes:      General:         Right eye: No discharge  Left eye: No discharge  Neck:      Thyroid: No thyromegaly  Cardiovascular:      Rate and Rhythm: Normal rate and regular rhythm  Pulses: Normal pulses  Heart sounds: Normal heart sounds  Pulmonary:      Effort: Pulmonary effort is normal       Breath sounds: Normal breath sounds  No wheezing or rhonchi  Abdominal:      General: Bowel sounds are normal  There is no distension  Palpations: Abdomen is soft  Tenderness: There is no abdominal tenderness  Musculoskeletal:         General: No swelling, tenderness or deformity  Normal range of motion  Cervical back: Normal range of motion and neck supple  Right lower leg: No edema  Left lower leg: No edema  Skin:     General: Skin is warm and dry  Findings: No erythema or rash  Neurological:      General: No focal deficit present  Mental Status: He is alert and oriented to person, place, and time  Psychiatric:         Mood and Affect: Mood normal          Behavior: Behavior normal          Thought Content: Thought content normal          Judgment: Judgment normal              Lab, imaging and other studies: I have personally reviewed pertinent reports  Dara Soulier

## 2022-09-26 ENCOUNTER — TELEPHONE (OUTPATIENT)
Dept: PSYCHIATRY | Facility: CLINIC | Age: 32
End: 2022-09-26

## 2022-09-26 NOTE — TELEPHONE ENCOUNTER
Called patient to offer med mgmt appt  Lvm advising patient to return call to Intake Dept at [phone] for scheduling purposes

## 2022-10-06 NOTE — TELEPHONE ENCOUNTER
" Called ( patient) regardingLVM advising to return call to intake @ [phone] to be (  scheduled)  "

## 2022-10-11 DIAGNOSIS — E10.9 WELL CONTROLLED TYPE 1 DIABETES MELLITUS (HCC): ICD-10-CM

## 2022-10-11 DIAGNOSIS — E10.65 TYPE 1 DIABETES MELLITUS WITH HYPERGLYCEMIA (HCC): ICD-10-CM

## 2022-10-11 RX ORDER — BLOOD-GLUCOSE SENSOR
EACH MISCELLANEOUS
Qty: 3 EACH | Refills: 0 | Status: SHIPPED | OUTPATIENT
Start: 2022-10-11

## 2022-10-16 DIAGNOSIS — E10.9 WELL CONTROLLED TYPE 1 DIABETES MELLITUS (HCC): ICD-10-CM

## 2022-11-02 ENCOUNTER — TELEPHONE (OUTPATIENT)
Dept: PSYCHIATRY | Facility: CLINIC | Age: 32
End: 2022-11-02

## 2022-11-09 DIAGNOSIS — F41.9 ANXIETY: ICD-10-CM

## 2022-11-09 DIAGNOSIS — E10.9 WELL CONTROLLED TYPE 1 DIABETES MELLITUS (HCC): ICD-10-CM

## 2022-11-09 DIAGNOSIS — R41.840 ATTENTION DEFICIT: ICD-10-CM

## 2022-11-09 DIAGNOSIS — E10.65 TYPE 1 DIABETES MELLITUS WITH HYPERGLYCEMIA (HCC): ICD-10-CM

## 2022-11-09 RX ORDER — BLOOD-GLUCOSE,RECEIVER,CONT
1 EACH MISCELLANEOUS
Qty: 1 EACH | Refills: 0 | Status: SHIPPED | OUTPATIENT
Start: 2022-11-09 | End: 2022-11-10 | Stop reason: SDUPTHER

## 2022-11-09 RX ORDER — ATOMOXETINE 25 MG/1
25 CAPSULE ORAL DAILY
Qty: 30 CAPSULE | Refills: 0 | Status: SHIPPED | OUTPATIENT
Start: 2022-11-09

## 2022-11-09 RX ORDER — CLONAZEPAM 0.5 MG/1
0.5 TABLET ORAL DAILY PRN
Qty: 30 TABLET | Refills: 0 | Status: SHIPPED | OUTPATIENT
Start: 2022-11-09

## 2022-11-09 RX ORDER — BLOOD-GLUCOSE SENSOR
EACH MISCELLANEOUS
Qty: 3 EACH | Refills: 1 | Status: SHIPPED | OUTPATIENT
Start: 2022-11-09

## 2022-11-10 ENCOUNTER — NURSE TRIAGE (OUTPATIENT)
Dept: OTHER | Facility: OTHER | Age: 32
End: 2022-11-10

## 2022-11-10 DIAGNOSIS — E10.65 TYPE 1 DIABETES MELLITUS WITH HYPERGLYCEMIA (HCC): ICD-10-CM

## 2022-11-10 RX ORDER — BLOOD-GLUCOSE,RECEIVER,CONT
1 EACH MISCELLANEOUS
Qty: 1 EACH | Refills: 0 | Status: SHIPPED | OUTPATIENT
Start: 2022-11-10 | End: 2022-11-10 | Stop reason: SDUPTHER

## 2022-11-10 RX ORDER — BLOOD-GLUCOSE,RECEIVER,CONT
1 EACH MISCELLANEOUS
Qty: 1 EACH | Refills: 0 | Status: SHIPPED | OUTPATIENT
Start: 2022-11-10

## 2022-11-10 NOTE — TELEPHONE ENCOUNTER
Reason for Disposition  • [1] Prescription prescribed recently is not at pharmacy AND [2] triager has access to patient's EMR AND [3] prescription is recorded in the EMR    Answer Assessment - Initial Assessment Questions  1  NAME of MEDICATION: "What medicine are you calling about?"      Dexcom     2  QUESTION: "What is your question?" (e g , medication refill, side effect)       Patient reports that all of his medications got to the pharmacy yesterday except for the [facility]     Reviewed medications that were sent yesterday; receipt confirmed by pharmacy for  but will re-send to pharmacy now as per patient request     Protocols used: MEDICATION QUESTION CALL-ADULT-

## 2022-11-10 NOTE — TELEPHONE ENCOUNTER
Regarding: urgent refill - please see medication list   ----- Message from Radha Irene sent at 11/10/2022  5:11 PM EST -----  "There should be prescriptions at the pharmacy for me from yesterday but they are not there, I have been out of a several of them"   I did go over all medications, advised the Klonopin will have to wait until tomorrow, He said that they are out of his Decom 6 monitors but did have the strips   It is 5 medications/monitors that were called in yesterday

## 2022-11-11 DIAGNOSIS — E10.65 TYPE 1 DIABETES MELLITUS WITH HYPERGLYCEMIA (HCC): ICD-10-CM

## 2022-11-11 RX ORDER — BLOOD-GLUCOSE TRANSMITTER
1 EACH MISCELLANEOUS
Qty: 3 EACH | Refills: 5 | Status: SHIPPED | OUTPATIENT
Start: 2022-11-11

## 2022-12-15 ENCOUNTER — TELEMEDICINE (OUTPATIENT)
Dept: FAMILY MEDICINE CLINIC | Facility: CLINIC | Age: 32
End: 2022-12-15

## 2022-12-15 DIAGNOSIS — F33.9 RECURRENT DEPRESSION (HCC): Primary | ICD-10-CM

## 2022-12-15 DIAGNOSIS — F41.9 ANXIETY: ICD-10-CM

## 2022-12-15 DIAGNOSIS — R41.840 ATTENTION DEFICIT: ICD-10-CM

## 2022-12-15 NOTE — PROGRESS NOTES
OFFICE VISIT  Kalani Sanchez 29 y o  male MRN: [id number]          Assessment / Plan:  Problem List Items Addressed This Visit        Other    Recurrent depression ([address] ) - Primary     Stable at this time, currently on no SSRIs  Moods are good         Anxiety     Minimum use of clonazepam, uses as needed  Attention deficit     Continue taking Strattera 25 mg, no changes at this time overall medication is working well              Reason For Visit / Chief Complaint  Chief Complaint   Patient presents with   • Follow-up     3 months        HPI:  Erasmo Jeffrey is a 29 y o  male who presents today for follow-up  Patient has known attention deficit, he was started on Strattera with no change  He does work night shift  He reports with current regimen he is able to stay on task and complete projects  He does have known anxiety he reports minimal use of clonazepam     Historical Information   History reviewed  No pertinent past medical history    Past Surgical History:   Procedure Laterality Date   • ANKLE FRACTURE SURGERY Left      Social History   Social History     Substance and Sexual Activity   Alcohol Use Yes    Comment: occasional     Social History     Substance and Sexual Activity   Drug Use Yes   • Types: Marijuana    Comment: medical card     Social History     Tobacco Use   Smoking Status Former   • Packs/day: 1 00   • Years: 10 00   • Pack years: 10 00   • Types: Cigarettes   • Start date:    • Quit date:    • Years since quittin 9   Smokeless Tobacco Never     Family History   Problem Relation Age of Onset   • Depression Mother    • Fibromyalgia Mother    • Anxiety disorder Mother    • Hypertension Father    • Allergies Brother        Meds/Allergies   Allergies   Allergen Reactions   • Grass Pollen(K-O-R-T-Swt James) Cough and Sneezing       Meds:    Current Outpatient Medications:   •  atoMOXetine (STRATTERA) 25 mg capsule, Take 1 capsule (25 mg total) by mouth daily, Disp: 30 capsule, Rfl: 0  •  clonazePAM (KlonoPIN) 0 5 mg tablet, Take 1 tablet (0 5 mg total) by mouth daily as needed for anxiety, Disp: 30 tablet, Rfl: 0  •  Continuous Blood Gluc  (Dexcom G6 ) LEONARD, Use 1 application every 14 (fourteen) days, Disp: 1 each, Rfl: 0  •  Continuous Blood Gluc Sensor (Dexcom G6 Sensor) MISC, Test blood sugar as directed, Disp: 3 each, Rfl: 1  •  Continuous Blood Gluc Transmit (Dexcom G6 Transmitter) MISC, Inject 1 Units under the skin every 6 (six) months, Disp: 3 each, Rfl: 5  •  HumaLOG 100 UNIT/ML injection, Inject 30 Units under the skin 3 (three) times a day with meals, Disp: 100 mL, Rfl: 2  •  lisinopril (ZESTRIL) 2 5 mg tablet, Take 1 tablet (2 5 mg total) by mouth daily (Patient not taking: Reported on 12/15/2022), Disp: 30 tablet, Rfl: 5      REVIEW OF SYSTEMS  Review of Systems   Constitutional: Negative for activity change, chills, fatigue and fever  HENT: Negative for congestion, ear discharge, ear pain, sinus pressure, sinus pain, sore throat, tinnitus and trouble swallowing  Eyes: Negative for photophobia, pain, discharge, itching and visual disturbance  Respiratory: Negative for cough, chest tightness, shortness of breath and wheezing  Cardiovascular: Negative for chest pain and leg swelling  Gastrointestinal: Negative for abdominal distention, abdominal pain, constipation, diarrhea, nausea and vomiting  Endocrine: Negative for polydipsia, polyphagia and polyuria  Genitourinary: Negative for dysuria and frequency  Musculoskeletal: Negative for arthralgias, myalgias, neck pain and neck stiffness  Skin: Negative for color change  Neurological: Negative for dizziness, syncope, weakness, numbness and headaches  Hematological: Does not bruise/bleed easily  Psychiatric/Behavioral: Negative for behavioral problems, confusion, self-injury, sleep disturbance and suicidal ideas  The patient is not nervous/anxious              Current Vitals: [unfilled]    PHYSICAL EXAMS:  Physical Exam  Constitutional:       Appearance: Normal appearance  HENT:      Head: Normocephalic and atraumatic  Pulmonary:      Effort: Pulmonary effort is normal    Neurological:      General: No focal deficit present  Mental Status: He is alert and oriented to person, place, and time  Psychiatric:         Mood and Affect: Mood normal          Behavior: Behavior normal              Lab, imaging and other studies: I have personally reviewed pertinent reports  Tarah Segura

## 2022-12-19 DIAGNOSIS — E10.9 WELL CONTROLLED TYPE 1 DIABETES MELLITUS (HCC): ICD-10-CM

## 2023-01-10 DIAGNOSIS — E10.65 TYPE 1 DIABETES MELLITUS WITH HYPERGLYCEMIA (HCC): ICD-10-CM

## 2023-01-10 RX ORDER — BLOOD-GLUCOSE SENSOR
EACH MISCELLANEOUS
Qty: 3 EACH | Refills: 0 | Status: SHIPPED | OUTPATIENT
Start: 2023-01-10

## 2023-01-12 ENCOUNTER — APPOINTMENT (OUTPATIENT)
Dept: LAB | Facility: MEDICAL CENTER | Age: 33
End: 2023-01-12

## 2023-01-12 ENCOUNTER — TELEPHONE (OUTPATIENT)
Dept: FAMILY MEDICINE CLINIC | Facility: CLINIC | Age: 33
End: 2023-01-12

## 2023-01-12 DIAGNOSIS — E10.65 TYPE 1 DIABETES MELLITUS WITH HYPERGLYCEMIA (HCC): ICD-10-CM

## 2023-01-12 LAB
ALBUMIN SERPL BCP-MCNC: 4.1 G/DL (ref 3.5–5)
ALP SERPL-CCNC: 59 U/L (ref 46–116)
ALT SERPL W P-5'-P-CCNC: 28 U/L (ref 12–78)
ANION GAP SERPL CALCULATED.3IONS-SCNC: 4 MMOL/L (ref 4–13)
AST SERPL W P-5'-P-CCNC: 17 U/L (ref 5–45)
BASOPHILS # BLD AUTO: 0.07 THOUSANDS/ÂΜL (ref 0–0.1)
BASOPHILS NFR BLD AUTO: 1 % (ref 0–1)
BILIRUB SERPL-MCNC: 0.82 MG/DL (ref 0.2–1)
BUN SERPL-MCNC: 15 MG/DL (ref 5–25)
CALCIUM SERPL-MCNC: 9.3 MG/DL (ref 8.3–10.1)
CHLORIDE SERPL-SCNC: 105 MMOL/L (ref 96–108)
CHOLEST SERPL-MCNC: 143 MG/DL
CO2 SERPL-SCNC: 26 MMOL/L (ref 21–32)
CREAT SERPL-MCNC: 0.87 MG/DL (ref 0.6–1.3)
EOSINOPHIL # BLD AUTO: 0.29 THOUSAND/ÂΜL (ref 0–0.61)
EOSINOPHIL NFR BLD AUTO: 5 % (ref 0–6)
ERYTHROCYTE [DISTWIDTH] IN BLOOD BY AUTOMATED COUNT: 12.1 % (ref 11.6–15.1)
EST. AVERAGE GLUCOSE BLD GHB EST-MCNC: 169 MG/DL
GFR SERPL CREATININE-BSD FRML MDRD: 115 ML/MIN/1.73SQ M
GLUCOSE SERPL-MCNC: 298 MG/DL (ref 65–140)
HBA1C MFR BLD: 7.5 %
HCT VFR BLD AUTO: 43.7 % (ref 36.5–49.3)
HDLC SERPL-MCNC: 54 MG/DL
HGB BLD-MCNC: 15.1 G/DL (ref 12–17)
IMM GRANULOCYTES # BLD AUTO: 0.01 THOUSAND/UL (ref 0–0.2)
IMM GRANULOCYTES NFR BLD AUTO: 0 % (ref 0–2)
LDLC SERPL CALC-MCNC: 77 MG/DL (ref 0–100)
LYMPHOCYTES # BLD AUTO: 1.77 THOUSANDS/ÂΜL (ref 0.6–4.47)
LYMPHOCYTES NFR BLD AUTO: 30 % (ref 14–44)
MCH RBC QN AUTO: 30.3 PG (ref 26.8–34.3)
MCHC RBC AUTO-ENTMCNC: 34.6 G/DL (ref 31.4–37.4)
MCV RBC AUTO: 88 FL (ref 82–98)
MONOCYTES # BLD AUTO: 0.43 THOUSAND/ÂΜL (ref 0.17–1.22)
MONOCYTES NFR BLD AUTO: 7 % (ref 4–12)
NEUTROPHILS # BLD AUTO: 3.41 THOUSANDS/ÂΜL (ref 1.85–7.62)
NEUTS SEG NFR BLD AUTO: 57 % (ref 43–75)
NRBC BLD AUTO-RTO: 0 /100 WBCS
PLATELET # BLD AUTO: 238 THOUSANDS/UL (ref 149–390)
PMV BLD AUTO: 10.1 FL (ref 8.9–12.7)
POTASSIUM SERPL-SCNC: 4.2 MMOL/L (ref 3.5–5.3)
PROT SERPL-MCNC: 7.5 G/DL (ref 6.4–8.4)
RBC # BLD AUTO: 4.99 MILLION/UL (ref 3.88–5.62)
SODIUM SERPL-SCNC: 135 MMOL/L (ref 135–147)
TRIGL SERPL-MCNC: 59 MG/DL
TSH SERPL DL<=0.05 MIU/L-ACNC: 1.45 UIU/ML (ref 0.45–4.5)
WBC # BLD AUTO: 5.98 THOUSAND/UL (ref 4.31–10.16)

## 2023-01-12 NOTE — TELEPHONE ENCOUNTER
Jhonatan Fuentes, this is Cheyenne Mattson calling to check in  I just wanted to let you know I'm on my way right now to get my blood work done, so I was wondering if Reema could still send the scripts for Lantus through anyway  I know she said I had to get the blood work done first  But given the situation, I don't have my insulin pump  I've been injecting one point three units of Humalog under my skin for the last hour, every hour for the last forty eight hours  I finally got my hands on some  insulin which I used and it doesn't seem to be working  My sugar is running around four hundred  So I was just wondering before the end of the day, Madhav could put in a prescription for Lantus that would really help me out  I would really help me prevent me from going to [facility]  I'm literally on my way to [facility] ER right now to get the blood work  Like I said, I work night shift, so everything 's hard for me to do a normal schedule, but I'll have the blood work in within probably a half hour  But I'm just trying to get this Lantus in through the end of the day  I don't want to have to keep using  insulin that I got from a coworker  So if you have any questions, please call me back  Thank you

## 2023-01-27 ENCOUNTER — OFFICE VISIT (OUTPATIENT)
Dept: FAMILY MEDICINE CLINIC | Facility: CLINIC | Age: 33
End: 2023-01-27

## 2023-01-27 VITALS
HEIGHT: 72 IN | DIASTOLIC BLOOD PRESSURE: 60 MMHG | RESPIRATION RATE: 14 BRPM | BODY MASS INDEX: 28.99 KG/M2 | SYSTOLIC BLOOD PRESSURE: 138 MMHG | HEART RATE: 87 BPM | TEMPERATURE: 97.4 F | WEIGHT: 214 LBS | OXYGEN SATURATION: 98 %

## 2023-01-27 DIAGNOSIS — Z00.00 ENCOUNTER FOR WELL ADULT EXAM WITHOUT ABNORMAL FINDINGS: Primary | ICD-10-CM

## 2023-01-27 DIAGNOSIS — E10.65 TYPE 1 DIABETES MELLITUS WITH HYPERGLYCEMIA (HCC): ICD-10-CM

## 2023-01-27 DIAGNOSIS — J06.9 VIRAL URI WITH COUGH: ICD-10-CM

## 2023-01-27 LAB
CREAT UR-MCNC: 71.3 MG/DL
MICROALBUMIN UR-MCNC: 5.9 MG/L (ref 0–20)
MICROALBUMIN/CREAT 24H UR: 8 MG/G CREATININE (ref 0–30)

## 2023-01-27 RX ORDER — DEXTROMETHORPHAN HYDROBROMIDE AND PROMETHAZINE HYDROCHLORIDE 15; 6.25 MG/5ML; MG/5ML
5 SYRUP ORAL 4 TIMES DAILY PRN
COMMUNITY
Start: 2023-01-19 | End: 2023-01-27 | Stop reason: SDUPTHER

## 2023-01-27 RX ORDER — FLUTICASONE PROPIONATE 50 MCG
2 SPRAY, SUSPENSION (ML) NASAL DAILY
COMMUNITY
Start: 2023-01-19 | End: 2023-01-29

## 2023-01-27 RX ORDER — DEXTROMETHORPHAN HYDROBROMIDE AND PROMETHAZINE HYDROCHLORIDE 15; 6.25 MG/5ML; MG/5ML
5 SYRUP ORAL 4 TIMES DAILY PRN
Qty: 240 ML | Refills: 0 | Status: SHIPPED | OUTPATIENT
Start: 2023-01-27

## 2023-01-27 RX ORDER — ALBUTEROL SULFATE 90 UG/1
2 AEROSOL, METERED RESPIRATORY (INHALATION) EVERY 6 HOURS PRN
COMMUNITY
Start: 2023-01-19 | End: 2023-01-29

## 2023-01-27 RX ORDER — INSULIN GLARGINE 100 [IU]/ML
20 INJECTION, SOLUTION SUBCUTANEOUS
Qty: 6 ML | Refills: 0 | Status: CANCELLED | OUTPATIENT
Start: 2023-01-27 | End: 2023-02-26

## 2023-01-27 NOTE — PROGRESS NOTES
ADULT ANNUAL [address]    NAME: German Sanchez  AGE: 29 y o  SEX: male  : 1990     DATE: 2023     Assessment and Plan:     Problem List Items Addressed This Visit        Endocrine    Type 1 diabetes mellitus with hyperglycemia ([facility] [address] )       Lab Results   Component Value Date    HGBA1C 7 5 (H) 2023   A1c slightly elevated, does report having problems with his insulin supplies, needed long-acting insulin until his supplies came in  Will be establishing with endocrinology  No current changes at this time  Relevant Orders    Microalbumin / creatinine urine ratio    Hemoglobin A1C    CBC and Platelet    Lipid Panel with Direct LDL reflex    TSH, 3rd generation with Free T4 reflex    Comprehensive metabolic panel   Other Visit Diagnoses     Encounter for well adult exam without abnormal findings    -  Primary    Viral URI with cough        Relevant Medications    promethazine-dextromethorphan (PHENERGAN-DM) 6 25-15 mg/5 mL oral syrup          Immunizations and preventive care screenings were discussed with patient today  Appropriate education was printed on patient's after visit summary  Counseling:  Alcohol/drug use: discussed moderation in alcohol intake, the recommendations for healthy alcohol use, and avoidance of illicit drug use  Dental Health: discussed importance of regular tooth brushing, flossing, and dental visits  Injury prevention: discussed safety/seat belts, safety helmets, smoke detectors, carbon dioxide detectors, and smoking near bedding or upholstery  Sexual health: discussed sexually transmitted diseases, partner selection, use of condoms, avoidance of unintended pregnancy, and contraceptive alternatives  · Exercise: the importance of regular exercise/physical activity was discussed  Recommend exercise 3-5 times per week for at least 30 minutes  BMI Counseling:  Body mass index is 29 43 kg/m²  The BMI is above normal  Nutrition recommendations include decreasing portion sizes  Exercise recommendations include moderate physical activity 150 minutes/week  Rationale for BMI follow-up plan is due to patient being overweight or obese  Return in about 6 months (around 2023)  Chief Complaint:     Chief Complaint   Patient presents with   • Diabetes     Has an apt scheduled with an endo end of April, foot exam- patient was instructed to remove socks and shoes    • Physical Exam     With labs,       History of Present Illness:     Adult Annual Physical   Patient here for a comprehensive physical exam  The patient reports no problems  Diet and Physical Activity  · Diet/Nutrition: diabetic diet  · Exercise: walking  Depression Screening  PHQ-2/9 Depression Screening    Little interest or pleasure in doing things: 0 - not at all  Feeling down, depressed, or hopeless: 1 - several days  Trouble falling or staying asleep, or sleeping too much: 3 - nearly every day  Feeling tired or having little energy: 3 - nearly every day  Poor appetite or overeatin - not at all  Feeling bad about yourself - or that you are a failure or have let yourself or your family down: 0 - not at all  Trouble concentrating on things, such as reading the newspaper or watching television: 2 - more than half the days  Moving or speaking so slowly that other people could have noticed  Or the opposite - being so fidgety or restless that you have been moving around a lot more than usual: 0 - not at all  Thoughts that you would be better off dead, or of hurting yourself in some way: 0 - not at all  PHQ-9 Score: 9   PHQ-9 Interpretation: Mild depression        General Health  · Sleep: sleeps well  · Hearing: normal - bilateral   · Vision: wears glasses  · Dental: regular dental visits         [facility]  · History of STDs?: no      Review of Systems:     Review of Systems   Constitutional: Negative for activity change, chills, fatigue and fever  HENT: Negative for congestion, ear discharge, ear pain, sinus pressure, sinus pain, sore throat, tinnitus and trouble swallowing  Eyes: Negative for photophobia, pain, discharge, itching and visual disturbance  Respiratory: Negative for cough, chest tightness, shortness of breath and wheezing  Cardiovascular: Negative for chest pain and leg swelling  Gastrointestinal: Negative for abdominal distention, abdominal pain, constipation, diarrhea, nausea and vomiting  Endocrine: Negative for polydipsia, polyphagia and polyuria  Genitourinary: Negative for dysuria and frequency  Musculoskeletal: Negative for arthralgias, myalgias, neck pain and neck stiffness  Skin: Negative for color change  Neurological: Negative for dizziness, syncope, weakness, numbness and headaches  Hematological: Does not bruise/bleed easily  Psychiatric/Behavioral: Negative for behavioral problems, confusion, self-injury, sleep disturbance and suicidal ideas  The patient is not nervous/anxious  Past Medical History:     History reviewed  No pertinent past medical history     Past Surgical History:     Past Surgical History:   Procedure Laterality Date   • ANKLE FRACTURE SURGERY Left       Social History:     Social History     Socioeconomic History   • Marital status: Single     Spouse name: None   • Number of children: None   • Years of education: None   • Highest education level: None   Occupational History   • Occupation: [employer]   Tobacco Use   • Smoking status: Former     Packs/day: 1 00     Years: 10 00     Pack years: 10 00     Types: Cigarettes     Start date:      Quit date: 2017     Years since quittin 0   • Smokeless tobacco: Never   Vaping Use   • Vaping Use: Every day   • Substances: THC   Substance and Sexual Activity   • Alcohol use: Yes     Comment: occasional   • Drug use: Yes     Types: Marijuana     Comment: medical card   • Sexual activity: None   Other Topics Concern   • None   Social History Narrative    · Most recent tobacco use screenin2019      · Do you currently or have you served in the [employer]:   No      · Were you activated, into active duty, as a member of the [employer] or as a Reservist:   No      · Exercise level:   Heavy      · Diet:   Regular      · General stress level:   Medium      · Alcohol intake:   Occasional      · Caffeine intake:    Moderate      · Seat belts used routinely:   Yes      · Sunscreen used routinely:   Yes      · Smoke alarm in home:   Yes      Social Determinants of Health     Financial Resource Strain: Not on file   Food Insecurity: Not on file   Transportation Needs: Not on file   Physical Activity: Not on file   Stress: Not on file   Social Connections: Not on file   Intimate Partner Violence: Not on file   Housing Stability: Not on file      Family History:     Family History   Problem Relation Age of Onset   • Depression Mother    • Fibromyalgia Mother    • Anxiety disorder Mother    • Hypertension Father    • Allergies Brother       Current Medications:     Current Outpatient Medications   Medication Sig Dispense Refill   • albuterol (PROVENTIL HFA,VENTOLIN HFA) 90 mcg/act inhaler Inhale 2 puffs every 6 (six) hours as needed PRN     • atoMOXetine (STRATTERA) 25 mg capsule Take 1 capsule (25 mg total) by mouth daily 30 capsule 0   • clonazePAM (KlonoPIN) 0 5 mg tablet Take 1 tablet (0 5 mg total) by mouth daily as needed for anxiety 30 tablet 0   • Continuous Blood Gluc  (Dexcom G6 ) LEONARD Use 1 application every 14 (fourteen) days 1 each 0   • Continuous Blood Gluc Sensor (Dexcom G6 Sensor) MISC TEST BLOOD SUGAR AS DIRECTED 3 each 0   • Continuous Blood Gluc Transmit (Dexcom G6 Transmitter) MISC Inject 1 Units under the skin every 6 (six) months 3 each 5   • fluticasone (FLONASE) 50 mcg/act nasal spray 2 sprays into each nostril daily     • HumaLOG 100 UNIT/ML injection Inject 10 Units under the skin 3 (three) times a day with meals 100 mL 2   • insulin glargine (LANTUS) 100 units/mL subcutaneous injection Inject 20 Units under the skin daily at bedtime 6 mL 0   • promethazine-dextromethorphan (PHENERGAN-DM) 6 25-15 mg/5 mL oral syrup Take 5 mL by mouth 4 (four) times a day as needed for cough 240 mL 0     No current facility-administered medications for this visit  Allergies: Allergies   Allergen Reactions   • Grass Pollen(K-O-R-T-Swt James) Cough and Sneezing      Physical Exam:     /60   Pulse 87   Temp (!) 97 4 °F (36 3 °C)   Resp 14   Ht 5' 11 5" (1 816 m)   Wt 97 1 kg (214 lb)   SpO2 98%   BMI 29 43 kg/m²     Physical Exam  Vitals and nursing note reviewed  Constitutional:       Appearance: Normal appearance  He is well-developed  HENT:      Head: Normocephalic and atraumatic  Right Ear: Tympanic membrane, ear canal and external ear normal       Left Ear: Tympanic membrane, ear canal and external ear normal       Nose: Nose normal  No congestion or rhinorrhea  Mouth/Throat:      Mouth: Mucous membranes are moist       Pharynx: No oropharyngeal exudate or posterior oropharyngeal erythema  Eyes:      General:         Right eye: No discharge  Left eye: No discharge  Conjunctiva/sclera: Conjunctivae normal       Pupils: Pupils are equal, round, and reactive to light  Neck:      Thyroid: No thyromegaly  Cardiovascular:      Rate and Rhythm: Normal rate and regular rhythm  Pulses: Normal pulses  no weak pulses          Dorsalis pedis pulses are 2+ on the right side and 2+ on the left side  Heart sounds: Normal heart sounds  Pulmonary:      Effort: Pulmonary effort is normal       Breath sounds: Normal breath sounds  No wheezing or rhonchi  Abdominal:      General: Bowel sounds are normal  There is no distension  Palpations: Abdomen is soft  Tenderness: There is no abdominal tenderness     Musculoskeletal:         General: No swelling, tenderness or deformity  Normal range of motion  Cervical back: Normal range of motion and neck supple  Right lower leg: No edema  Left lower leg: No edema  Feet:      Right foot:      Skin integrity: No ulcer, skin breakdown, erythema, warmth, callus or dry skin  Left foot:      Skin integrity: No ulcer, skin breakdown, erythema, warmth, callus or dry skin  Skin:     General: Skin is warm and dry  Findings: No erythema or rash  Neurological:      General: No focal deficit present  Mental Status: He is alert and oriented to person, place, and time  Psychiatric:         Mood and Affect: Mood normal          Behavior: Behavior normal          Thought Content: Thought content normal          Judgment: Judgment normal           BMI Counseling: Body mass index is 29 43 kg/m²  The BMI is above normal  Nutrition recommendations include reducing portion sizes  Exercise recommendations include moderate aerobic physical activity for 150 minutes/week  [facility]   [facility] [address]   Patient's shoes and socks removed  Right Foot/Ankle   Right Foot Inspection  Skin Exam: skin normal and skin intact  No dry skin, no warmth, no callus, no erythema, no maceration, no abnormal color, no pre-ulcer, no ulcer and no callus  Toe Exam: ROM and strength within normal limits  Sensory   Vibration: intact  Proprioception: intact  Monofilament testing: intact    Vascular  Capillary refills: < 3 seconds  The right DP pulse is 2+  Left Foot/Ankle  Left Foot Inspection  Skin Exam: skin normal and skin intact  No dry skin, no warmth, no erythema, no maceration, normal color, no pre-ulcer, no ulcer and no callus  Toe Exam: ROM and strength within normal limits  Sensory   Vibration: intact  Proprioception: intact  Monofilament testing: intact    Vascular  Capillary refills: < 3 seconds  The left DP pulse is 2+       Assign Risk Category  No deformity present  No loss of protective sensation  No weak pulses  Risk: 0

## 2023-01-27 NOTE — ASSESSMENT & PLAN NOTE
Lab Results   Component Value Date    HGBA1C 7 5 (H) 01/12/2023   A1c slightly elevated, does report having problems with his insulin supplies, needed long-acting insulin until his supplies came in  Will be establishing with endocrinology  No current changes at this time

## 2023-02-09 DIAGNOSIS — E10.65 TYPE 1 DIABETES MELLITUS WITH HYPERGLYCEMIA (HCC): ICD-10-CM

## 2023-02-09 RX ORDER — BLOOD-GLUCOSE SENSOR
EACH MISCELLANEOUS
Qty: 3 EACH | Refills: 2 | Status: SHIPPED | OUTPATIENT
Start: 2023-02-09

## 2023-02-09 RX ORDER — BLOOD-GLUCOSE TRANSMITTER
1 EACH MISCELLANEOUS
Qty: 3 EACH | Refills: 5 | Status: SHIPPED | OUTPATIENT
Start: 2023-02-09

## 2023-03-03 DIAGNOSIS — E10.9 WELL CONTROLLED TYPE 1 DIABETES MELLITUS (HCC): ICD-10-CM

## 2023-04-10 PROBLEM — Z72.51 UNPROTECTED SEXUAL INTERCOURSE: Status: ACTIVE | Noted: 2023-04-10

## 2023-05-08 DIAGNOSIS — E10.65 TYPE 1 DIABETES MELLITUS WITH HYPERGLYCEMIA (HCC): ICD-10-CM

## 2023-05-08 RX ORDER — PROCHLORPERAZINE 25 MG/1
SUPPOSITORY RECTAL
Qty: 3 EACH | Refills: 0 | Status: SHIPPED | OUTPATIENT
Start: 2023-05-08

## 2023-05-08 RX ORDER — PROCHLORPERAZINE 25 MG/1
1 SUPPOSITORY RECTAL
Qty: 3 EACH | Refills: 5 | Status: SHIPPED | OUTPATIENT
Start: 2023-05-08

## 2023-06-05 DIAGNOSIS — E10.65 TYPE 1 DIABETES MELLITUS WITH HYPERGLYCEMIA (HCC): ICD-10-CM

## 2023-06-05 RX ORDER — PROCHLORPERAZINE 25 MG/1
SUPPOSITORY RECTAL
Qty: 3 EACH | Refills: 0 | Status: SHIPPED | OUTPATIENT
Start: 2023-06-05

## 2023-07-05 DIAGNOSIS — E10.65 TYPE 1 DIABETES MELLITUS WITH HYPERGLYCEMIA (HCC): ICD-10-CM

## 2023-07-05 RX ORDER — PROCHLORPERAZINE 25 MG/1
SUPPOSITORY RECTAL
Qty: 3 EACH | Refills: 1 | Status: SHIPPED | OUTPATIENT
Start: 2023-07-05

## 2023-07-20 ENCOUNTER — APPOINTMENT (EMERGENCY)
Dept: RADIOLOGY | Facility: HOSPITAL | Age: 33
End: 2023-07-20
Payer: OTHER MISCELLANEOUS

## 2023-07-20 ENCOUNTER — HOSPITAL ENCOUNTER (EMERGENCY)
Facility: HOSPITAL | Age: 33
Discharge: HOME | End: 2023-07-20
Attending: EMERGENCY MEDICINE
Payer: OTHER MISCELLANEOUS

## 2023-07-20 VITALS
HEART RATE: 64 BPM | RESPIRATION RATE: 18 BRPM | DIASTOLIC BLOOD PRESSURE: 83 MMHG | BODY MASS INDEX: 27.71 KG/M2 | OXYGEN SATURATION: 100 % | TEMPERATURE: 98.2 F | SYSTOLIC BLOOD PRESSURE: 139 MMHG | WEIGHT: 201.5 LBS

## 2023-07-20 DIAGNOSIS — Z77.098 EXPOSURE TO CHEMICAL INHALATION: Primary | ICD-10-CM

## 2023-07-20 LAB
ATRIAL RATE: 192 BPM
QRS AXIS: 80 DEGREES
QRSD INTERVAL: 82 MS
QT INTERVAL: 382 MS
QTC INTERVAL: 406 MS
T WAVE AXIS: 52 DEGREES
VENTRICULAR RATE: 68 BPM

## 2023-07-20 PROCEDURE — 71046 X-RAY EXAM CHEST 2 VIEWS: CPT

## 2023-07-20 PROCEDURE — 99284 EMERGENCY DEPT VISIT MOD MDM: CPT

## 2023-07-20 PROCEDURE — 96372 THER/PROPH/DIAG INJ SC/IM: CPT

## 2023-07-20 PROCEDURE — 93010 ELECTROCARDIOGRAM REPORT: CPT | Performed by: INTERNAL MEDICINE

## 2023-07-20 PROCEDURE — 93005 ELECTROCARDIOGRAM TRACING: CPT

## 2023-07-20 RX ORDER — ALBUTEROL SULFATE 90 UG/1
2 AEROSOL, METERED RESPIRATORY (INHALATION) ONCE
Status: COMPLETED | OUTPATIENT
Start: 2023-07-20 | End: 2023-07-20

## 2023-07-20 RX ORDER — KETOROLAC TROMETHAMINE 30 MG/ML
15 INJECTION, SOLUTION INTRAMUSCULAR; INTRAVENOUS ONCE
Status: COMPLETED | OUTPATIENT
Start: 2023-07-20 | End: 2023-07-20

## 2023-07-20 RX ADMIN — KETOROLAC TROMETHAMINE 15 MG: 30 INJECTION, SOLUTION INTRAMUSCULAR; INTRAVENOUS at 20:03

## 2023-07-20 RX ADMIN — ALBUTEROL SULFATE 2 PUFF: 90 AEROSOL, METERED RESPIRATORY (INHALATION) at 20:02

## 2023-07-20 NOTE — Clinical Note
[place] was seen and treated in our emergency department on 7/20/2023. Diagnosis:     Debi Parks  . He may return on this date: If you have any questions or concerns, please don't hesitate to call.       Karen Maldonado PA-C    ______________________________           _______________          _______________  Hospital Representative                              Date                                Time

## 2023-07-20 NOTE — Clinical Note
Law Vazquez was seen and treated in our emergency department on 7/20/2023. Diagnosis:     Za Merlos  may return to work on return date. He may return on this date: 07/24/2023         If you have any questions or concerns, please don't hesitate to call.       Lane Olmos PA-C    ______________________________           _______________          _______________  Hospital Representative                              Date                                Time

## 2023-07-20 NOTE — ED PROVIDER NOTES
History  Chief Complaint   Patient presents with   • Chemical Exposure     Pt states inhaled vaporized hydrochloric acid at work today around [employer]. States feeling sob, congestion, tightness in chest and throat. 82-year-old male with a past medical history significant for T1DM, reportedly well controlled, presenting to the ED with complaint of throat tightness and irritation as well as chest tightness, irritation and some feelings of SOB. Patient reports he works at [employer] and when he was cleaning one of the machines this morning around 6am vaporized HCl accidentally escaped from the machine and he inhaled some of the chemical.  States it started to fill the room and he got out of the room as soon as he could. Reports he was exposed for maybe 5-10 seconds. States that he had safety glasses on but did not have a respirator on. Reports he did have some eye irritation initially after but resolved when he removed himself from the environment. Had some "lung irritation" initially but went home and took a nap. Reports upon waking he had feelings of throat irritation and tightness as well as lung irritation, chest tightness and some shortness of breath. He denies any significant pain. Denies any taking medications PTA. Denies wheezing, stridor, increased work of breathing and respiratory distress. Denies chest pain, palpitations, diaphoresis or syncope. No cardiac history in himself but reports "heart disease" in his father. States he also has been having nasal congestion and rhinorrhea since the incident. Reports prior to this incident he has otherwise been feeling well. Denies any concern for skin irritation. No vision changes or eye complaints. (+) Mild HA. Denies any other complaints today and reports he has otherwise been well. Specifically denies fever, chills, cough, abdominal pain, N/V/D, urinary complaints, diarrhea, neck pain, back pain, weakness and any other complaint.  Did smoke and vape previously but has since quit. Prior to Admission Medications   Prescriptions Last Dose Informant Patient Reported? Taking? Continuous Blood Gluc  (Dexcom G6 ) LEONARD   No No   Sig: Use 1 application every 14 (fourteen) days   Continuous Blood Gluc Sensor (Dexcom G6 Sensor) MISC   No No   Sig: test blood sugar as directed. sensor change every 10 days. Continuous Blood Gluc Transmit (Dexcom G6 Transmitter) MISC   No No   Sig: Inject 1 Units under the skin every 6 (six) months   HumaLOG 100 UNIT/ML injection   No No   Sig: Inject 50 Units under the skin 3 (three) times a day with meals   atoMOXetine (STRATTERA) 25 mg capsule   No No   Sig: Take 1 capsule (25 mg total) by mouth daily   clonazePAM (KlonoPIN) 0.5 mg tablet   No No   Sig: Take 1 tablet (0.5 mg total) by mouth daily as needed for anxiety   fluticasone (FLONASE) 50 mcg/act nasal spray   Yes No   Si sprays into each nostril daily   insulin glargine (LANTUS) 100 units/mL subcutaneous injection   No No   Sig: Inject 20 Units under the skin daily at bedtime   promethazine-dextromethorphan (PHENERGAN-DM) 6.25-15 mg/5 mL oral syrup   No No   Sig: Take 5 mL by mouth 4 (four) times a day as needed for cough      Facility-Administered Medications: None       History reviewed. No pertinent past medical history. Past Surgical History:   Procedure Laterality Date   • ANKLE FRACTURE SURGERY Left        Family History   Problem Relation Age of Onset   • Depression Mother    • Fibromyalgia Mother    • Anxiety disorder Mother    • Hypertension Father    • Allergies Brother      I have reviewed and agree with the history as documented.     E-Cigarette/Vaping   • E-Cigarette Use Current Every Day User      E-Cigarette/Vaping Substances   • Nicotine No    • THC Yes    • CBD No    • Flavoring No    • Other No    • Unknown No      Social History     Tobacco Use   • Smoking status: Former     Packs/day: 1.00     Years: 10.00     Total pack years: 10.00     Types: Cigarettes     Start date:      Quit date: 2017     Years since quittin.5   • Smokeless tobacco: Never   Vaping Use   • Vaping Use: Every day   • Substances: THC   Substance Use Topics   • Alcohol use: Yes     Comment: occasional   • Drug use: Not Currently     Types: Marijuana     Comment: medical card       Review of Systems   Constitutional: Negative for chills, diaphoresis, fatigue and fever. HENT: Positive for congestion, postnasal drip and rhinorrhea. Negative for sore throat and trouble swallowing.         (+) Throat tightness/irritation    Respiratory: Positive for chest tightness and shortness of breath. Negative for apnea, cough, choking, wheezing and stridor. Cardiovascular: Negative for chest pain, palpitations and leg swelling. Gastrointestinal: Negative for abdominal pain, nausea and vomiting. Genitourinary: Negative for difficulty urinating. Musculoskeletal: Negative for myalgias, neck pain and neck stiffness. Skin: Negative for rash. Neurological: Positive for headaches. Negative for dizziness, syncope, weakness and light-headedness. Psychiatric/Behavioral: Negative for confusion. Physical Exam  Physical Exam  Vitals and nursing note reviewed. Constitutional:       General: He is not in acute distress. Appearance: He is well-developed. Comments: Awake, alert, interactive and resting in the stretcher in no acute distress. Patient is not ill or toxic appearing. HENT:      Head: Normocephalic and atraumatic. Nose: Nose normal.      Mouth/Throat:      Comments: MMM. Oropharynx patent and clear. No erythema, exudates, lesions or sores. No pharyngeal swelling. Uvula midline. Patient maintaining oral airway and secretions without issue. Normal phonation. No stridor. Eyes:      Conjunctiva/sclera: Conjunctivae normal.   Cardiovascular:      Rate and Rhythm: Normal rate and regular rhythm.       Heart sounds: No murmur heard.  Pulmonary:      Effort: Pulmonary effort is normal. No respiratory distress. Breath sounds: Normal breath sounds. Comments: Clear breath sounds auscultated throughout all lung fields bilaterally. Adequate air movement. No wheeze, rhonchi, rales, increased WOB, retractions, accessory muscle use or respiratory distress. O2 sat- 100 % on RA. Abdominal:      Palpations: Abdomen is soft. Tenderness: There is no abdominal tenderness. Musculoskeletal:         General: No swelling. Cervical back: Neck supple. Right lower leg: No edema. Left lower leg: No edema. Comments: Normal muscle tone and moving all extremities without issue. Lymphadenopathy:      Cervical: No cervical adenopathy. Skin:     General: Skin is warm and dry. Capillary Refill: Capillary refill takes less than 2 seconds. Findings: No rash. Neurological:      General: No focal deficit present. Mental Status: He is alert and oriented to person, place, and time. GCS: GCS eye subscore is 4. GCS verbal subscore is 5. GCS motor subscore is 6.    Psychiatric:         Mood and Affect: Mood normal.         Vital Signs  ED Triage Vitals   Temperature Pulse Respirations Blood Pressure SpO2   07/20/23 1905 07/20/23 1905 07/20/23 1905 07/20/23 1905 07/20/23 1905   98.2 °F (36.8 °C) 84 18 (!) 172/89 100 %      Temp Source Heart Rate Source Patient Position - Orthostatic VS BP Location FiO2 (%)   07/20/23 1905 07/20/23 1905 07/20/23 1905 07/20/23 1905 --   Oral Monitor Sitting Right arm       Pain Score       07/20/23 2003 5           Vitals:    07/20/23 1905 07/20/23 2104   BP: (!) 172/89 139/83   Pulse: 84 64   Patient Position - Orthostatic VS: Sitting Sitting         Visual Acuity      ED Medications  Medications   albuterol (PROVENTIL HFA,VENTOLIN HFA) inhaler 2 puff (2 puffs Inhalation Given 7/20/23 2002)   ketorolac (TORADOL) injection 15 mg (15 mg Intramuscular Given 7/20/23 2003) Diagnostic Studies  Results Reviewed     None                 XR chest 2 views   ED Interpretation by Johana Haley PA-C (07/20 2036)   ED wet read:  No acute cardiopulmonary disease appreciated. Procedures  ECG 12 Lead Documentation Only    Date/Time: 7/20/2023 8:25 PM    Performed by: Johana Haley PA-C  Authorized by: Johana Haley PA-C    Indications / Diagnosis:  Chest tightness, lung irritation, mild SOB   ECG reviewed by me, the ED Provider: yes    Patient location:  ED  Previous ECG:     Previous ECG:  Unavailable  Quality:     Tracing quality:  Limited by artifact  Rate:     ECG rate:  68    ECG rate assessment: normal    Rhythm:     Rhythm: sinus rhythm    Ectopy:     Ectopy: none    QRS:     QRS axis:  Indeterminate    QRS intervals:  Normal  Conduction:     Conduction: normal    ST segments:     ST segments:  Normal  T waves:     T waves: normal    Comments:      Read as nonspecific ST abnormality however limited EKG 2/2 to artifact. ECG 12 Lead Documentation Only    Date/Time: 7/20/2023 8:31 PM    Performed by: Johana Haley PA-C  Authorized by: Johana Haley PA-C    Indications / Diagnosis:  Chest tightness, lung irritation, mild SOB- repeat   ECG reviewed by me, the ED Provider: yes    Patient location:  ED  Quality:     Tracing quality:  Limited by artifact  Rate:     ECG rate:  67    ECG rate assessment: normal    Rhythm:     Rhythm: sinus rhythm    Ectopy:     Ectopy: none    QRS:     QRS axis:  Indeterminate    QRS intervals:  Normal  Conduction:     Conduction: normal    ST segments:     ST segments:  Normal  T waves:     T waves: normal    Comments:      Read as accelerated junctional however appears to be sinus rhythm.               ED Course  ED Course as of 07/20/23 2148   Thu Jul 20, 2023 2129 Blood Pressure: 139/83   2129 Temperature: 98.2 °F (36.8 °C)   2129 Pulse: 84   2129 Respirations: 18   2129 SpO2: 100 %   2141 On bedside reevaluation patient resting comfortably in stretcher no acute distress. Reports somewhat improvement of his chest tightness with the albuterol. States he is feeling better. No other specific complaints at this time. Verbally communicate all findings from today's work-up with the patient at bedside including pending official CXR read. Patient reports he feels well to go home. States he does have a PCP, advised to call in the morning for close follow-up in 2 days or sooner. Advised to continue albuterol, 2 pumps every 4-6 hours as needed. Continue ibuprofen or acetaminophen as needed for pain control. Strict return precautions verbally communicated to the patient as outlined in the AVS.  All patient questions and concerns were answered. Patient verbally communicated their understanding and agreement to the above plan. Patient stable at discharge. Portions of the record may have been created with voice recognition software. Occasional wrong word or "sound a like" substitutions may have occurred due to the inherent limitations of voice recognition software. Read the chart carefully and recognize, using context, where substitutions have occurred. SBIRT 22yo+    Flowsheet Row Most Recent Value   Initial Alcohol Screen: US AUDIT-C     1. How often do you have a drink containing alcohol? 0 Filed at: 07/20/2023 1954   2. How many drinks containing alcohol do you have on a typical day you are drinking? 0 Filed at: 07/20/2023 1954   3a. Male UNDER 65: How often do you have five or more drinks on one occasion? 0 Filed at: 07/20/2023 1954   3b. FEMALE Any Age, or MALE 65+: How often do you have 4 or more drinks on one occassion? 0 Filed at: 07/20/2023 1954   Audit-C Score 0 Filed at: 07/20/2023 1954   ALDO: How many times in the past year have you. .. Used an illegal drug or used a prescription medication for non-medical reasons?  Never Filed at: 07/20/2023 1954 Medical Decision Making  29-year-old male with a past medical history significant for T1DM presents ED with complaint of throat tightness/irritation as well as chest tightness, lung irritation and mild SOB. Reports this took occurred after he inhaled vaporized HCl at 6 AM today. Reports did have some eye irritation initially but resolved shortly after movement still from the HCl. States he had some "long"" immediately after but felt well enough to go home and take a nap. Reports upon waking he continued with the throat tightness feeling and irritation as well as a chest tightness, lung irritation and SOB. He denies any other specific complaints today. Denies any significant pain. Reports prior to this incident he has otherwise been feeling well. On exam patient is a very well-appearing 29-year-old male resting in stretcher no acute distress. VSS. O2 sat-100% on RA. Alert, awake, interactive and in no acute distress. Lungs CTA B/L. NRRR, no murmur appreciated. 2+ radial pulses. Distal cap refill <2 secs. MMM. Oropharynx patent and clear. GCS 15. A&O x3.  PE otherwise unremarkable. In light of patient with exposure to HCl about 12 hours ago with persistent feelings of chest tightness, chest irritation and mild SOB will check an EKG for possible arrhythmia. Doubt ACS/MI at this time. Will also check a CXR in light of complaints of chest tightness/irritation. Likely component of mucosal injury. Will give albuterol inhaler for chest tightness/SOB. In light of patient with no worsening of symptoms, stable vitals and otherwise unremarkable PE will defer further labs and imaging at this time. Disposition likely home however will reevaluate after above stated plan. Amount and/or Complexity of Data Reviewed  Radiology: ordered. Risk  Prescription drug management.           Disposition  Final diagnoses:   Exposure to chemical inhalation     Time reflects when diagnosis was documented in both MDM as applicable and the Disposition within this note     Time User Action Codes Description Comment    7/20/2023  9:40 PM Chevy Balloon Add [O60.721] Exposure to chemical inhalation       ED Disposition     ED Disposition   Discharge    Condition   Stable    Date/Time   u Jul 20, 2023  9:40 PM    Comment   Ace Drown Roche discharge to home/self care. Follow-up Information     Follow up With Specialties Details Why Contact Info Additional [facility] Emergency Department Emergency Medicine Go to  As needed, If symptoms worsen [address] [id number]  [address] [facility] Emergency Department, [address] [place] [address], [facility]          Patient's Medications   Discharge Prescriptions    No medications on file       No discharge procedures on file.     PDMP Review       Value Time User    PDMP Reviewed  Yes 3/21/2022  8:54 PM [facility]          ED Provider  Electronically Signed by           Brandy Mora PA-C  07/20/23 8319

## 2023-07-21 ENCOUNTER — TELEPHONE (OUTPATIENT)
Dept: FAMILY MEDICINE CLINIC | Facility: CLINIC | Age: 33
End: 2023-07-21

## 2023-07-21 ENCOUNTER — APPOINTMENT (OUTPATIENT)
Dept: URGENT CARE | Facility: CLINIC | Age: 33
End: 2023-07-21
Payer: OTHER MISCELLANEOUS

## 2023-07-21 LAB
ATRIAL RATE: 227 BPM
QRS AXIS: 85 DEGREES
QRSD INTERVAL: 82 MS
QT INTERVAL: 372 MS
QTC INTERVAL: 393 MS
T WAVE AXIS: 51 DEGREES
VENTRICULAR RATE: 67 BPM

## 2023-07-21 PROCEDURE — 93010 ELECTROCARDIOGRAM REPORT: CPT | Performed by: INTERNAL MEDICINE

## 2023-07-21 PROCEDURE — 99214 OFFICE O/P EST MOD 30 MIN: CPT

## 2023-07-21 NOTE — DISCHARGE INSTRUCTIONS
Please return to the ED for any concerns as outlined in the AVS or for any other concerns. Please follow-up with your primary care provider in 2 days for re-evaluation and further management. Continue ibuprofen or acetaminophen as needed for pain control. Continue albuterol inhaler, 2 pumps every 4-6 hours as needed for continued chest tightness or feelings of SOB. Continue stay well-hydrated.     Consider rest.

## 2023-07-25 ENCOUNTER — OCCMED (OUTPATIENT)
Dept: URGENT CARE | Facility: CLINIC | Age: 33
End: 2023-07-25
Payer: OTHER MISCELLANEOUS

## 2023-07-25 DIAGNOSIS — J68.9: Primary | ICD-10-CM

## 2023-07-25 PROCEDURE — 99213 OFFICE O/P EST LOW 20 MIN: CPT

## 2023-07-31 ENCOUNTER — OFFICE VISIT (OUTPATIENT)
Dept: FAMILY MEDICINE CLINIC | Facility: CLINIC | Age: 33
End: 2023-07-31
Payer: COMMERCIAL

## 2023-07-31 VITALS
DIASTOLIC BLOOD PRESSURE: 80 MMHG | OXYGEN SATURATION: 98 % | HEART RATE: 87 BPM | WEIGHT: 206.2 LBS | HEIGHT: 62 IN | TEMPERATURE: 97 F | SYSTOLIC BLOOD PRESSURE: 136 MMHG | BODY MASS INDEX: 37.94 KG/M2

## 2023-07-31 DIAGNOSIS — E10.65 TYPE 1 DIABETES MELLITUS WITH HYPERGLYCEMIA (HCC): Primary | ICD-10-CM

## 2023-07-31 DIAGNOSIS — F33.9 RECURRENT DEPRESSION (HCC): ICD-10-CM

## 2023-07-31 DIAGNOSIS — R41.840 ATTENTION DEFICIT: ICD-10-CM

## 2023-07-31 LAB — SL AMB POCT HEMOGLOBIN AIC: 7.1 (ref ?–6.5)

## 2023-07-31 PROCEDURE — 99214 OFFICE O/P EST MOD 30 MIN: CPT | Performed by: NURSE PRACTITIONER

## 2023-07-31 PROCEDURE — 83036 HEMOGLOBIN GLYCOSYLATED A1C: CPT | Performed by: NURSE PRACTITIONER

## 2023-07-31 RX ORDER — PROCHLORPERAZINE 25 MG/1
1 SUPPOSITORY RECTAL
Qty: 3 EACH | Refills: 5 | Status: SHIPPED | OUTPATIENT
Start: 2023-07-31

## 2023-07-31 RX ORDER — PROCHLORPERAZINE 25 MG/1
SUPPOSITORY RECTAL
Qty: 3 EACH | Refills: 5 | Status: SHIPPED | OUTPATIENT
Start: 2023-07-31

## 2023-07-31 NOTE — PROGRESS NOTES
OFFICE VISIT  Lilli Sandhoff Roche 29 y.o. male MRN: [id number]          Assessment / Plan:  Problem List Items Addressed This Visit        Endocrine    Type 1 diabetes mellitus with hyperglycemia ([address]) - Primary       Lab Results   Component Value Date    HGBA1C 7.1 (A) 07/31/2023   Forms completed for insulin pump, this is an upgrade from what he originally was using. A1c is 7.1 which has improved from beginning of the year at 7.5. Labs reprinted and reminder provided. Reminder given for eye exam.  He is looking for a support group, referral provided for endocrinology         Relevant Medications    Continuous Blood Gluc Transmit (Dexcom G6 Transmitter) MISC    Continuous Blood Gluc Sensor (Dexcom G6 Sensor) MISC    Other Relevant Orders    POCT hemoglobin A1c (Completed)    Ambulatory Referral to Endocrinology       Other    Recurrent depression ([address])     Stable off medication         Attention deficit     Overall stable off Strattera              Reason For Visit / Chief Complaint  Chief Complaint   Patient presents with   • Follow-up     Pt presents to the office for a 6 month f/u  Pt report that he was in Er for a work related issues from expo to chemicals at work  Pt report in the morning he feelings congested but it clears up as the day goes on        HPI:  Hannah Vega is a 29 y.o. male who presents today for [facility] follow up. He has know type 1 dm, age 7. Has been having fluctuating glucose levels, hypoglycemia. Does keep sugar on hand. Had CGM      Historical Information   History reviewed. No pertinent past medical history.   Past Surgical History:   Procedure Laterality Date   • ANKLE FRACTURE SURGERY Left      Social History   Social History     Substance and Sexual Activity   Alcohol Use Yes    Comment: occasional     Social History     Substance and Sexual Activity   Drug Use Not Currently   • Types: Marijuana    Comment: medical card     Social History     Tobacco Use   Smoking Status Former   • Packs/day: 1.00   • Years: 10.00   • Total pack years: 10.00   • Types: Cigarettes   • Start date:    • Quit date:    • Years since quittin.5   Smokeless Tobacco Never     Family History   Problem Relation Age of Onset   • Depression Mother    • Fibromyalgia Mother    • Anxiety disorder Mother    • Hypertension Father    • Allergies Brother        Meds/Allergies   Allergies   Allergen Reactions   • Grass Pollen(K-[id number]-Swt James) Cough and Sneezing       Meds:    Current Outpatient Medications:   •  Continuous Blood Gluc  (Dexcom G6 ) LEONARD, Use 1 application every 14 (fourteen) days, Disp: 1 each, Rfl: 0  •  Continuous Blood Gluc Sensor (Dexcom G6 Sensor) MISC, test blood sugar as directed. sensor change every 10 days. , Disp: 3 each, Rfl: 5  •  Continuous Blood Gluc Transmit (Dexcom G6 Transmitter) MISC, Inject 1 Units under the skin every 6 (six) months, Disp: 3 each, Rfl: 5  •  HumaLOG 100 UNIT/ML injection, Inject 50 Units under the skin 3 (three) times a day with meals, Disp: 250 mL, Rfl: 2  •  insulin glargine (LANTUS) 100 units/mL subcutaneous injection, Inject 20 Units under the skin daily at bedtime, Disp: 6 mL, Rfl: 0      REVIEW OF SYSTEMS  Review of Systems   Constitutional: Negative for activity change, chills, fatigue and fever. HENT: Negative for congestion, ear discharge, ear pain, sinus pressure, sinus pain, sore throat, tinnitus and trouble swallowing. Eyes: Negative for photophobia, pain, discharge, itching and visual disturbance. Respiratory: Negative for cough, chest tightness, shortness of breath and wheezing. Cardiovascular: Negative for chest pain and leg swelling. Gastrointestinal: Negative for abdominal distention, abdominal pain, constipation, diarrhea, nausea and vomiting. Endocrine: Negative for polydipsia, polyphagia and polyuria. Genitourinary: Negative for dysuria and frequency.    Musculoskeletal: Negative for arthralgias, myalgias, neck pain and neck stiffness. Skin: Negative for color change. Neurological: Negative for dizziness, syncope, weakness, numbness and headaches. Hematological: Does not bruise/bleed easily. Psychiatric/Behavioral: Negative for behavioral problems, confusion, self-injury, sleep disturbance and suicidal ideas. The patient is not nervous/anxious. Current Vitals:   Blood Pressure: 136/80 (07/31/23 1118)  Pulse: 87 (07/31/23 1118)  Temperature: (!) 97 °F (36.1 °C) (07/31/23 1118)  Height: 5' 1.5" (156.2 cm) (07/31/23 1118)  Weight - Scale: 93.5 kg (206 lb 3.2 oz) (07/31/23 1118)  SpO2: 98 % (07/31/23 1118)  [unfilled]    PHYSICAL EXAMS:  Physical Exam  Vitals and nursing note reviewed. Constitutional:       Appearance: Normal appearance. He is well-developed and normal weight. HENT:      Head: Normocephalic and atraumatic. Right Ear: Tympanic membrane, ear canal and external ear normal.      Left Ear: Tympanic membrane, ear canal and external ear normal.      Nose: Nose normal. No congestion or rhinorrhea. Mouth/Throat:      Mouth: Mucous membranes are moist.      Pharynx: No oropharyngeal exudate or posterior oropharyngeal erythema. Eyes:      General:         Right eye: No discharge. Left eye: No discharge. Conjunctiva/sclera: Conjunctivae normal.      Pupils: Pupils are equal, round, and reactive to light. Neck:      Thyroid: No thyromegaly. Cardiovascular:      Rate and Rhythm: Normal rate and regular rhythm. Pulses: Normal pulses. Heart sounds: Normal heart sounds. Pulmonary:      Effort: Pulmonary effort is normal.      Breath sounds: Normal breath sounds. No wheezing or rhonchi. Abdominal:      General: Bowel sounds are normal. There is no distension. Palpations: Abdomen is soft. Tenderness: There is no abdominal tenderness. Musculoskeletal:         General: No swelling, tenderness or deformity. Normal range of motion.       Cervical back: Normal range of motion and neck supple. Right lower leg: No edema. Left lower leg: No edema. Skin:     General: Skin is warm and dry. Findings: No erythema or rash. Neurological:      General: No focal deficit present. Mental Status: He is alert and oriented to person, place, and time. Psychiatric:         Mood and Affect: Mood normal.         Behavior: Behavior normal.         Thought Content: Thought content normal.         Judgment: Judgment normal.             Lab, imaging and other studies: I have personally reviewed pertinent reports. Zainab Hernandes

## 2023-07-31 NOTE — ASSESSMENT & PLAN NOTE
Lab Results   Component Value Date    HGBA1C 7.1 (A) 07/31/2023   Forms completed for insulin pump, this is an upgrade from what he originally was using. A1c is 7.1 which has improved from beginning of the year at 7.5. Labs reprinted and reminder provided.   Reminder given for eye exam.  He is looking for a support group, referral provided for endocrinology

## 2023-08-01 ENCOUNTER — TELEPHONE (OUTPATIENT)
Dept: ENDOCRINOLOGY | Facility: CLINIC | Age: 33
End: 2023-08-01

## 2023-08-22 ENCOUNTER — TELEPHONE (OUTPATIENT)
Dept: FAMILY MEDICINE CLINIC | Facility: CLINIC | Age: 33
End: 2023-08-22

## 2023-08-24 DIAGNOSIS — E10.65 TYPE 1 DIABETES MELLITUS WITH HYPERGLYCEMIA (HCC): Primary | ICD-10-CM

## 2023-08-24 RX ORDER — INSULIN LISPRO 100 [IU]/ML
INJECTION, SOLUTION INTRAVENOUS; SUBCUTANEOUS
Qty: 60 ML | Refills: 0 | Status: SHIPPED | OUTPATIENT
Start: 2023-08-24

## 2023-11-02 ENCOUNTER — CONSULT (OUTPATIENT)
Dept: ENDOCRINOLOGY | Facility: CLINIC | Age: 33
End: 2023-11-02
Payer: COMMERCIAL

## 2023-11-02 ENCOUNTER — APPOINTMENT (OUTPATIENT)
Dept: LAB | Facility: CLINIC | Age: 33
End: 2023-11-02
Payer: COMMERCIAL

## 2023-11-02 VITALS
HEART RATE: 96 BPM | HEIGHT: 62 IN | WEIGHT: 209.2 LBS | OXYGEN SATURATION: 98 % | DIASTOLIC BLOOD PRESSURE: 78 MMHG | TEMPERATURE: 97.5 F | SYSTOLIC BLOOD PRESSURE: 128 MMHG | BODY MASS INDEX: 38.5 KG/M2

## 2023-11-02 DIAGNOSIS — Z46.81 INSULIN PUMP TITRATION: ICD-10-CM

## 2023-11-02 DIAGNOSIS — E55.9 VITAMIN D DEFICIENCY: ICD-10-CM

## 2023-11-02 DIAGNOSIS — E10.65 TYPE 1 DIABETES MELLITUS WITH HYPERGLYCEMIA (HCC): ICD-10-CM

## 2023-11-02 DIAGNOSIS — E10.65 TYPE 1 DIABETES MELLITUS WITH HYPERGLYCEMIA (HCC): Primary | ICD-10-CM

## 2023-11-02 LAB
25(OH)D3 SERPL-MCNC: 37.9 NG/ML (ref 30–100)
ALBUMIN SERPL BCP-MCNC: 4.7 G/DL (ref 3.5–5)
ALP SERPL-CCNC: 53 U/L (ref 34–104)
ALT SERPL W P-5'-P-CCNC: 18 U/L (ref 7–52)
ANION GAP SERPL CALCULATED.3IONS-SCNC: 9 MMOL/L
AST SERPL W P-5'-P-CCNC: 20 U/L (ref 13–39)
BILIRUB SERPL-MCNC: 1.13 MG/DL (ref 0.2–1)
BUN SERPL-MCNC: 11 MG/DL (ref 5–25)
CALCIUM SERPL-MCNC: 9.8 MG/DL (ref 8.4–10.2)
CHLORIDE SERPL-SCNC: 100 MMOL/L (ref 96–108)
CHOLEST SERPL-MCNC: 179 MG/DL
CO2 SERPL-SCNC: 29 MMOL/L (ref 21–32)
CREAT SERPL-MCNC: 0.79 MG/DL (ref 0.6–1.3)
CREAT UR-MCNC: 139.4 MG/DL
EST. AVERAGE GLUCOSE BLD GHB EST-MCNC: 163 MG/DL
GFR SERPL CREATININE-BSD FRML MDRD: 120 ML/MIN/1.73SQ M
GLUCOSE P FAST SERPL-MCNC: 189 MG/DL (ref 65–99)
HBA1C MFR BLD: 7.3 %
HDLC SERPL-MCNC: 67 MG/DL
LDLC SERPL CALC-MCNC: 96 MG/DL (ref 0–100)
MICROALBUMIN UR-MCNC: 10.9 MG/L
MICROALBUMIN/CREAT 24H UR: 8 MG/G CREATININE (ref 0–30)
POTASSIUM SERPL-SCNC: 4.5 MMOL/L (ref 3.5–5.3)
PROT SERPL-MCNC: 7.7 G/DL (ref 6.4–8.4)
SODIUM SERPL-SCNC: 138 MMOL/L (ref 135–147)
TRIGL SERPL-MCNC: 82 MG/DL

## 2023-11-02 PROCEDURE — 80061 LIPID PANEL: CPT

## 2023-11-02 PROCEDURE — 80053 COMPREHEN METABOLIC PANEL: CPT

## 2023-11-02 PROCEDURE — 82570 ASSAY OF URINE CREATININE: CPT

## 2023-11-02 PROCEDURE — 95251 CONT GLUC MNTR ANALYSIS I&R: CPT | Performed by: STUDENT IN AN ORGANIZED HEALTH CARE EDUCATION/TRAINING PROGRAM

## 2023-11-02 PROCEDURE — 82306 VITAMIN D 25 HYDROXY: CPT

## 2023-11-02 PROCEDURE — 36415 COLL VENOUS BLD VENIPUNCTURE: CPT

## 2023-11-02 PROCEDURE — 83036 HEMOGLOBIN GLYCOSYLATED A1C: CPT

## 2023-11-02 PROCEDURE — 99244 OFF/OP CNSLTJ NEW/EST MOD 40: CPT | Performed by: STUDENT IN AN ORGANIZED HEALTH CARE EDUCATION/TRAINING PROGRAM

## 2023-11-02 PROCEDURE — 82043 UR ALBUMIN QUANTITATIVE: CPT

## 2023-11-02 NOTE — PROGRESS NOTES
New Patient Progress Note      Cc: diabetes    Referring Provider  Queenie Cortés, [id number] [facility] [id number] [facility],  [address] [facility]     History of Present Illness:   Mark Torres is a 29 y.o. male with a history of type 1 diabetes since age 7 who presents to establish care with us. He was following with [facility] in the past,   Diabetes has been managed by PCP,     Reports complications of none. Denies recent illness or hospitalizations. Denies recent severe hypoglycemic or severe hyperglycemic episodes. Denies any issues with his current regimen. home glucose monitoring via CGM      Patient is on a Medtronic 670 G pump prescribed by Endocrinology . He has been on this pump for 10 years. He denies any malfunctioning of the pump. Current Insulin pump settings:  Basal rate:  12 am: 1.3 unts/hr    Insulin to carb ratio:  1: 10   Insulin sensitivity factor:  35   BG target:100 - 120   Type of insulin:humalog   Active Insulin Time: 3     Discussed with patient in case of malfunctioning of the pump to use basal and bolus therapy as backup which is prescribed to the patient. Also notified patient to call clinic if any issues. Kymberly Cornejo Roche   Device used,Dexcom G6  Home use       Indication   Type 1 Diabetes      More than 72 hours of data was reviewed. Report to be scanned to chart.      Date Range: October 20 - November 2nd    Analysis of data:   Average Glucose: 173 mg/dl  Coefficient of Variation: x   SD : 67 mg/dl   Time in Target Range: 56%   Time Above Range: 41%   Time Below Range: <3%       Hypoglycemic episodes:   H/o of hypoglycemia causing hospitalization or Intervention such as glucagon injection  or ambulance call : no  Hypoglycemia symptoms:shaky, sweaty  Treatment of hypoglycemia: juice, fruit snack         Opthamology: UTD  Podiatry: UTD      Has hypertension: no   Has hyperlipidemia: no    Thyroid disorders: no    Patient Active Problem List   Diagnosis    Recurrent depression ([address]) Anxiety    Panic attack    Type 1 diabetes mellitus with hyperglycemia (HCC)    Attention deficit    Unprotected sexual intercourse      History reviewed. No pertinent past medical history. Past Surgical History:   Procedure Laterality Date    ANKLE FRACTURE SURGERY Left       Family History   Problem Relation Age of Onset    Depression Mother     Fibromyalgia Mother     Anxiety disorder Mother     Hypertension Father     Allergies Brother      Social History     Tobacco Use    Smoking status: Former     Packs/day: 1.00     Years: 10.00     Total pack years: 10.00     Types: Cigarettes     Start date:      Quit date: 2017     Years since quittin.8    Smokeless tobacco: Never   Substance Use Topics    Alcohol use: Yes     Comment: occasional     Allergies   Allergen Reactions    Grass Pollen(K-O-R-T-Swt James) Cough and Sneezing         Current Outpatient Medications:     Continuous Blood Gluc  (Dexcom G6 ) LEONARD, Use 1 application every 14 (fourteen) days, Disp: 1 each, Rfl: 0    Continuous Blood Gluc Sensor (Dexcom G6 Sensor) MISC, test blood sugar as directed. sensor change every 10 days. , Disp: 3 each, Rfl: 5    Continuous Blood Gluc Transmit (Dexcom G6 Transmitter) MISC, Inject 1 Units under the skin every 6 (six) months, Disp: 3 each, Rfl: 5    HumaLOG 100 UNIT/ML injection, Inject 50 Units under the skin 3 (three) times a day with meals, Disp: 250 mL, Rfl: 2    HumaLOG 100 UNIT/ML injection, inject 50 units under the skin daily as directed, Disp: 60 mL, Rfl: 0    insulin glargine (LANTUS) 100 units/mL subcutaneous injection, Inject 20 Units under the skin daily at bedtime, Disp: 6 mL, Rfl: 0  Review of Systems   Constitutional:  Negative for appetite change, fatigue and unexpected weight change. HENT:  Negative for trouble swallowing and voice change. Eyes:  Negative for visual disturbance. Respiratory:  Negative for cough, shortness of breath and wheezing.     Cardiovascular: Negative for palpitations and leg swelling. Gastrointestinal:  Negative for abdominal pain, constipation, diarrhea, nausea and vomiting. Endocrine: Negative for cold intolerance, heat intolerance, polyphagia and polyuria. Musculoskeletal:  Negative for arthralgias. Skin:  Negative for color change, rash and wound. Neurological:  Negative for dizziness, tremors, weakness, light-headedness, numbness and headaches. Psychiatric/Behavioral:  Negative for agitation and sleep disturbance. The patient is not nervous/anxious. Physical Exam:  Body mass index is 38.89 kg/m².   /78 (BP Location: Left arm, Patient Position: Sitting, Cuff Size: Standard)   Pulse 96   Temp 97.5 °F (36.4 °C) (Tympanic)   Ht 5' 1.5" (1.562 m)   Wt 94.9 kg (209 lb 3.2 oz)   SpO2 98%   BMI 38.89 kg/m²    Wt Readings from Last 3 Encounters:   11/02/23 94.9 kg (209 lb 3.2 oz)   07/31/23 93.5 kg (206 lb 3.2 oz)   07/20/23 91.4 kg (201 lb 8 oz)       GEN: NAD  E/n/m nl facies,   Eyes: no stare or proptosis,   Neck: trachea midline, thyroid NT to palpation, nl in size, no nodules   CV; heart reg rate s1s2 nl, no m/r/g appreciated, no MANAV  Resp: CTAB, good effort  Ab+BS  Neuro: no tremor, 2+ DTRs in BUE  MS: no c/c in digits, moves all 4 ext, nl muscle bulk, gait nl  Skin: warm and dry, no palmar erythema  Ext: no edema bilaterally,   Psych: nl mood and affect, no gross lapses in memory      Labs:   No components found for: "HA1C"  No components found for: "GLU"    Lab Results   Component Value Date    CREATININE 0.87 01/12/2023    CREATININE 0.70 08/06/2021    CREATININE 0.81 12/05/2020    BUN 15 01/12/2023    K 4.2 01/12/2023     01/12/2023    CO2 26 01/12/2023     eGFR   Date Value Ref Range Status   01/12/2023 115 ml/min/1.73sq m Final     No components found for: "MALBCRER"    Lab Results   Component Value Date    HDL 54 01/12/2023    TRIG 59 01/12/2023       Lab Results   Component Value Date    ALT 28 01/12/2023 AST 17 01/12/2023    ALKPHOS 59 01/12/2023       No results found for: "TSH", "FREET4", "TSI"    Impression:  1. Type 1 diabetes mellitus with hyperglycemia (HCC)    2. Vitamin D deficiency    3. Insulin pump titration           Plan:    Sultana Coronado was seen today for consult. Diagnoses and all orders for this visit:    Type 1 diabetes mellitus with hyperglycemia ([address]):  Suboptimally controlled, with A1C of 7.3%, with A1C goal < 6.5%. We reviewed pump report and CGM report, he works night shift, I increased night time basal and decreased daytime basal.  He is interested to upgrade to 780G, we contacted [employer] rep. Return back in 3 months. Complete labs as ordered. -     Ambulatory Referral to Endocrinology  -     Hemoglobin A1C; Future  -     Comprehensive metabolic panel; Future  -     Lipid Panel with Direct LDL reflex; Future  -     Albumin / creatinine urine ratio; Future    Vitamin D deficiency  -     Vitamin D 25 hydroxy; Future    Insulin pump titration:  See plan #1      Discussed with the patient and all questioned fully answered. He will call me if any problems arise.     Counseled patient on diagnostic results, prognosis, risk and benefit of treatment options, instruction for management, importance of treatment compliance, Risk  factor reduction and impressions      Tra Plasencia MD

## 2023-11-05 PROBLEM — Z46.81 INSULIN PUMP TITRATION: Status: ACTIVE | Noted: 2023-11-05

## 2023-11-05 PROBLEM — E55.9 VITAMIN D DEFICIENCY: Status: ACTIVE | Noted: 2023-11-05

## 2024-01-24 DIAGNOSIS — E10.65 TYPE 1 DIABETES MELLITUS WITH HYPERGLYCEMIA (HCC): ICD-10-CM

## 2024-01-25 RX ORDER — PROCHLORPERAZINE 25 MG/1
SUPPOSITORY RECTAL
Qty: 3 EACH | Refills: 0 | Status: SHIPPED | OUTPATIENT
Start: 2024-01-25

## 2024-02-07 ENCOUNTER — OFFICE VISIT (OUTPATIENT)
Dept: FAMILY MEDICINE CLINIC | Facility: CLINIC | Age: 34
End: 2024-02-07
Payer: COMMERCIAL

## 2024-02-07 VITALS
DIASTOLIC BLOOD PRESSURE: 70 MMHG | WEIGHT: 222.8 LBS | BODY MASS INDEX: 41 KG/M2 | SYSTOLIC BLOOD PRESSURE: 130 MMHG | HEART RATE: 79 BPM | TEMPERATURE: 97.3 F | HEIGHT: 62 IN | RESPIRATION RATE: 18 BRPM | OXYGEN SATURATION: 98 %

## 2024-02-07 DIAGNOSIS — Z00.00 ENCOUNTER FOR WELL ADULT EXAM WITHOUT ABNORMAL FINDINGS: Primary | ICD-10-CM

## 2024-02-07 DIAGNOSIS — E10.65 TYPE 1 DIABETES MELLITUS WITH HYPERGLYCEMIA (HCC): ICD-10-CM

## 2024-02-07 PROCEDURE — 99395 PREV VISIT EST AGE 18-39: CPT | Performed by: NURSE PRACTITIONER

## 2024-02-07 NOTE — ASSESSMENT & PLAN NOTE
Managed by prasad, has upcoming appt   Lab Results   Component Value Date    HGBA1C 7.3 (H) 11/02/2023

## 2024-02-07 NOTE — PROGRESS NOTES
ADULT ANNUAL PHYSICAL  [facility]    NAME: Gregory Sanchez  AGE: 33 y.o. SEX: male  : 1990     DATE: 2024     Assessment and Plan:     Problem List Items Addressed This Visit          Endocrine    Type 1 diabetes mellitus with hyperglycemia (HCC)     Managed by endo, has upcoming appt   Lab Results   Component Value Date    HGBA1C 7.3 (H) 2023            Relevant Orders    HEMOGLOBIN A1C W/ EAG ESTIMATION    Comprehensive metabolic panel    CBC    Lipid Panel with Direct LDL reflex    TSH, 3rd generation with Free T4 reflex    Albumin / creatinine urine ratio     Other Visit Diagnoses       Encounter for well adult exam without abnormal findings    -  Primary            Immunizations and preventive care screenings were discussed with patient today. Appropriate education was printed on patient's after visit summary.    Counseling:  Alcohol/drug use: discussed moderation in alcohol intake, the recommendations for healthy alcohol use, and avoidance of illicit drug use.  Dental Health: discussed importance of regular tooth brushing, flossing, and dental visits.  Injury prevention: discussed safety/seat belts, safety helmets, smoke detectors, carbon dioxide detectors, and smoking near bedding or upholstery.  Sexual health: discussed sexually transmitted diseases, partner selection, use of condoms, avoidance of unintended pregnancy, and contraceptive alternatives.  Exercise: the importance of regular exercise/physical activity was discussed. Recommend exercise 3-5 times per week for at least 30 minutes.          Return in about 6 months (around 2024).     Chief Complaint:     Chief Complaint   Patient presents with    Physical Exam      History of Present Illness:     Adult Annual Physical   Patient here for a comprehensive physical exam. The patient reports no problems.    Diet and Physical Activity  Diet/Nutrition: well balanced diet  and diabetic diet.   Exercise: moderate cardiovascular exercise.      Depression Screening  PHQ-2/9 Depression Screening    Little interest or pleasure in doing things: 0 - not at all  Feeling down, depressed, or hopeless: 0 - not at all  Trouble falling or staying asleep, or sleeping too much: 0 - not at all  Feeling tired or having little energy: 0 - not at all  Poor appetite or overeatin - not at all  Feeling bad about yourself - or that you are a failure or have let yourself or your family down: 0 - not at all  Trouble concentrating on things, such as reading the newspaper or watching television: 0 - not at all  Moving or speaking so slowly that other people could have noticed. Or the opposite - being so fidgety or restless that you have been moving around a lot more than usual: 0 - not at all  Thoughts that you would be better off dead, or of hurting yourself in some way: 0 - not at all  PHQ-9 Score: 0  PHQ-9 Interpretation: No or Minimal depression       General Health  Sleep: gets 7-8 hours of sleep on average.   Hearing: normal - bilateral.  Vision: wears glasses.   Dental: regular dental visits.        Health  History of STDs?: no.    Advanced Care Planning  Do you have an advanced directive? no  Do you have a durable medical power of ? no     Review of Systems:     Review of Systems   Constitutional:  Negative for activity change, chills, fatigue and fever.   HENT:  Negative for congestion, ear discharge, ear pain, sinus pressure, sinus pain, sore throat, tinnitus and trouble swallowing.    Eyes:  Negative for photophobia, pain, discharge, itching and visual disturbance.   Respiratory:  Negative for cough, chest tightness, shortness of breath and wheezing.    Cardiovascular:  Negative for chest pain and leg swelling.   Gastrointestinal:  Negative for abdominal distention, abdominal pain, constipation, diarrhea, nausea and vomiting.   Endocrine: Negative for polydipsia, polyphagia and  polyuria.   Genitourinary:  Negative for dysuria and frequency.   Musculoskeletal:  Negative for arthralgias, myalgias, neck pain and neck stiffness.   Skin:  Negative for color change.   Neurological:  Negative for dizziness, syncope, weakness, numbness and headaches.   Hematological:  Does not bruise/bleed easily.   Psychiatric/Behavioral:  Negative for behavioral problems, confusion, self-injury, sleep disturbance and suicidal ideas. The patient is not nervous/anxious.       Past Medical History:     History reviewed. No pertinent past medical history.   Past Surgical History:     Past Surgical History:   Procedure Laterality Date    ANKLE FRACTURE SURGERY Left       Social History:     Social History     Socioeconomic History    Marital status: Single     Spouse name: None    Number of children: None    Years of education: None    Highest education level: None   Occupational History    Occupation: [employer]   Tobacco Use    Smoking status: Former     Current packs/day: 0.00     Average packs/day: 1 pack/day for 10.0 years (10.0 ttl pk-yrs)     Types: Cigarettes     Start date:      Quit date:      Years since quittin.1    Smokeless tobacco: Never   Vaping Use    Vaping status: Some Days    Substances: THC   Substance and Sexual Activity    Alcohol use: Yes     Comment: occasional    Drug use: Not Currently     Types: Marijuana     Comment: medical card    Sexual activity: None   Other Topics Concern    None   Social History Narrative    · Most recent tobacco use screenin2019      · Do you currently or have you served in the [employer]:   No      · Were you activated, into active duty, as a member of the [employer] or as a Reservist:   No      · Exercise level:   Heavy      · Diet:   Regular      · General stress level:   Medium      · Alcohol intake:   Occasional      · Caffeine intake:   Moderate      · Seat belts used routinely:   Yes      · Sunscreen used routinely:   Yes       "· Smoke alarm in home:   Yes      Social Determinants of Health     Financial Resource Strain: Not on file   Food Insecurity: Not on file   Transportation Needs: Not on file   Physical Activity: Not on file   Stress: Not on file   Social Connections: Not on file   Intimate Partner Violence: Not on file   Housing Stability: Not on file      Family History:     Family History   Problem Relation Age of Onset    Depression Mother     Fibromyalgia Mother     Anxiety disorder Mother     Hypertension Father     Allergies Brother       Current Medications:     Current Outpatient Medications   Medication Sig Dispense Refill    Continuous Blood Gluc  (Dexcom G6 ) LEONARD Use 1 application every 14 (fourteen) days 1 each 0    Continuous Blood Gluc Sensor (Dexcom G6 Sensor) MISC TEST BLOOD SUGAR AS DIRECTED. CHANGE SENSOR EVERY 10 DAYS. 3 each 0    Continuous Blood Gluc Transmit (Dexcom G6 Transmitter) MISC Inject 1 Units under the skin every 6 (six) months 3 each 5    HumaLOG 100 UNIT/ML injection Inject 50 Units under the skin 3 (three) times a day with meals 250 mL 2    insulin glargine (LANTUS) 100 units/mL subcutaneous injection Inject 20 Units under the skin daily at bedtime 6 mL 0    HumaLOG 100 UNIT/ML injection inject 50 units under the skin daily as directed (Patient not taking: Reported on 2/7/2024) 60 mL 0     No current facility-administered medications for this visit.      Allergies:     Allergies   Allergen Reactions    Grass Pollen(K-O-R-T-Swt James) Cough and Sneezing      Physical Exam:     /70 (BP Location: Left arm, Patient Position: Sitting, Cuff Size: Standard)   Pulse 79   Temp (!) 97.3 °F (36.3 °C) (Tympanic)   Resp 18   Ht 5' 1.5\" (1.562 m)   Wt 101 kg (222 lb 12.8 oz)   SpO2 98%   BMI 41.42 kg/m²     Physical Exam  Vitals and nursing note reviewed.   Constitutional:       Appearance: Normal appearance. He is well-developed.   HENT:      Head: Normocephalic and atraumatic.      " Right Ear: Tympanic membrane, ear canal and external ear normal.      Left Ear: Tympanic membrane, ear canal and external ear normal.      Nose: Nose normal. No congestion or rhinorrhea.      Mouth/Throat:      Mouth: Mucous membranes are moist.      Pharynx: No oropharyngeal exudate or posterior oropharyngeal erythema.   Eyes:      General:         Right eye: No discharge.         Left eye: No discharge.      Conjunctiva/sclera: Conjunctivae normal.      Pupils: Pupils are equal, round, and reactive to light.   Neck:      Thyroid: No thyromegaly.   Cardiovascular:      Rate and Rhythm: Normal rate and regular rhythm.      Pulses: Normal pulses.      Heart sounds: Normal heart sounds.   Pulmonary:      Effort: Pulmonary effort is normal.      Breath sounds: Normal breath sounds. No wheezing or rhonchi.   Abdominal:      General: Bowel sounds are normal. There is no distension.      Palpations: Abdomen is soft.      Tenderness: There is no abdominal tenderness.   Musculoskeletal:         General: No swelling, tenderness or deformity. Normal range of motion.      Cervical back: Normal range of motion and neck supple.      Right lower leg: No edema.      Left lower leg: No edema.   Skin:     General: Skin is warm and dry.      Findings: No erythema or rash.   Neurological:      General: No focal deficit present.      Mental Status: He is alert and oriented to person, place, and time.   Psychiatric:         Mood and Affect: Mood normal.         Behavior: Behavior normal.         Thought Content: Thought content normal.         Judgment: Judgment normal.          JEFFERSON Calixto   [facility]

## 2024-02-15 ENCOUNTER — TELEMEDICINE (OUTPATIENT)
Dept: FAMILY MEDICINE CLINIC | Facility: CLINIC | Age: 34
End: 2024-02-15
Payer: COMMERCIAL

## 2024-02-15 DIAGNOSIS — U07.1 COVID-19 VIRUS INFECTION: Primary | ICD-10-CM

## 2024-02-15 PROCEDURE — 99213 OFFICE O/P EST LOW 20 MIN: CPT | Performed by: NURSE PRACTITIONER

## 2024-02-15 RX ORDER — NIRMATRELVIR AND RITONAVIR 300-100 MG
3 KIT ORAL 2 TIMES DAILY
Qty: 30 TABLET | Refills: 0 | Status: SHIPPED | OUTPATIENT
Start: 2024-02-15 | End: 2024-02-20

## 2024-02-15 NOTE — PROGRESS NOTES
COVID-19 Outpatient Progress Note    Assessment/Plan:    Problem List Items Addressed This Visit          Other    COVID-19 virus infection - Primary     COVID-19 Home Care Guidelines    Your healthcare provider and/or public health staff have evaluated you and have determined that you do not need to remain in the hospital at this time.  At this time you can be isolated at home where you will be monitored by staff from your local or state health department. You should carefully follow the prevention and isolation steps below until a healthcare provider or local or state health department says that you can return to your normal activities.      Stay home except to get medical care    People who are mildly ill with COVID-19 are able to isolate at home during their illness. You should restrict activities outside your home, except for getting medical care. Do not go to work, school, or public areas. Avoid using public transportation, ride-sharing, or taxis.    Separate yourself from other people and animals in your home    People: As much as possible, you should stay in a specific room and away from other people in your home. Also, you should use a separate bathroom, if available.  Animals: You should restrict contact with pets and other animals while you are sick with COVID-19, just like you would around other people. Although there have not been reports of pets or other animals becoming sick with COVID-19, it is still recommended that people sick with COVID-19 limit contact with animals until more information is known about the virus. When possible, have another member of your household care for your animals while you are sick. If you are sick with COVID-19, avoid contact with your pet, including petting, snuggling, being kissed or licked, and sharing food. If you must care for your pet or be around animals while you are sick, wash your hands before and after you interact with pets and wear a facemask. See COVID-19  and Animals for more information.    Call ahead before visiting your doctor    If you have a medical appointment, call the healthcare provider and tell them that you have or may have COVID-19. This will help the healthcare provider’s office take steps to keep other people from getting infected or exposed.    Wear a facemask    You should wear a facemask when you are around other people (e.g., sharing a room or vehicle) or pets and before you enter a healthcare provider’s office. If you are not able to wear a facemask (for example, because it causes trouble breathing), then people who live with you should not stay in the same room with you, or they should wear a facemask if they enter your room.    Cover your coughs and sneezes    Cover your mouth and nose with a tissue when you cough or sneeze. Throw used tissues in a lined trash can. Immediately wash your hands with soap and water for at least 20 seconds or, if soap and water are not available, clean your hands with an alcohol-based hand  that contains at least 60% alcohol.    Clean your hands often    Wash your hands often with soap and water for at least 20 seconds, especially after blowing your nose, coughing, or sneezing; going to the bathroom; and before eating or preparing food. If soap and water are not readily available, use an alcohol-based hand  with at least 60% alcohol, covering all surfaces of your hands and rubbing them together until they feel dry.  Soap and water are the best option if hands are visibly dirty. Avoid touching your eyes, nose, and mouth with unwashed hands.    Avoid sharing personal household items    You should not share dishes, drinking glasses, cups, eating utensils, towels, or bedding with other people or pets in your home. After using these items, they should be washed thoroughly with soap and water.    Clean all “high-touch” surfaces everyday    High touch surfaces include counters, tabletops, doorknobs,  bathroom fixtures, toilets, phones, keyboards, tablets, and bedside tables. Also, clean any surfaces that may have blood, stool, or body fluids on them. Use a household cleaning spray or wipe, according to the label instructions. Labels contain instructions for safe and effective use of the cleaning product including precautions you should take when applying the product, such as wearing gloves and making sure you have good ventilation during use of the product.    Monitor your symptoms    Seek prompt medical attention if your illness is worsening (e.g., difficulty breathing). Before seeking care, call your healthcare provider and tell them that you have, or are being evaluated for, COVID-19. Put on a facemask before you enter the facility. These steps will help the healthcare provider’s office to keep other people in the office or waiting room from getting infected or exposed. Ask your healthcare provider to call the local or state health department. Persons who are placed under active monitoring or facilitated self-monitoring should follow instructions provided by their local health department or occupational health professionals, as appropriate.  If you have a medical emergency and need to call 911, notify the dispatch personnel that you have, or are being evaluated for COVID-19. If possible, put on a facemask before emergency medical services arrive.    Discontinuing home isolation    Patients with confirmed COVID-19 should remain under home isolation precautions until the following conditions are met:   They have had no fever for at least 24 hours (that is one full day of no fever without the use medicine that reduces fevers)  AND  other symptoms have improved (for example, when their cough or shortness of breath have improved)  AND  If had mild or moderate illness, at least 10 days have passed since their symptoms first appeared or if severe illness (needed oxygen) or immunosuppressed, at least 20 days have  passed since symptoms first appeared  Patients with confirmed COVID-19 should also notify close contacts (including their workplace) and ask that they self-quarantine. Currently, close contact is defined as being within 6 feet for 15 minutes or more from the period 24 hours starting 48 hours before symptom onset to the time at which the patient went into isolation.  Close contacts of patients diagnosed with COVID-19 should be instructed by the patient to self-quarantine for 14 days from the last time of their last contact with the patient.     Source: https://www.cdc.gov/coronavirus/2019-ncov/hcp/guidance-prevent-spread.html          Relevant Medications    nirmatrelvir & ritonavir (Paxlovid, 300/100,) tablet therapy pack      Disposition:     Discussed vitamin D, vitamin C, and/or zinc supplementation with patient.     Patient meets criteria for Paxlovid and they have been counseled appropriately regarding risks, benefits, side effects, and alternative treatment options. After discussion, patient agrees to treatment.    Possible side effects of Paxlovid?    Possible side effects of Paxlovid are:  - Liver Problems. Notify us right away if you start to experience loss of appetite, yellowing of your skin and the whites of eyes (jaundice), dark-colored urine, pale colored stools and itchy skin, stomach area (abdominal) pain.  - Resistance to HIV Medicines. If you have untreated HIV infection, Paxlovid may lead to some HIV medicines not working as well in the future.  - Other possible side effects include: altered sense of taste, diarrhea, high blood pressure, or muscle aches.    I have spent a total time of 15 minutes on the day of the encounter for this patient including discussing prognosis, risks and benefits of treatment options, patient and family education, importance of treatment compliance and impressions.       Encounter provider: JEFFERSON Calixto     Provider located at: [facility]  [place] [facility]  [address]  [place] [address]  [phone]     Recent Visits  No visits were found meeting these conditions.  Showing recent visits within past 7 days and meeting all other requirements  Today's Visits  Date Type Provider Dept   02/15/24 Telemedicine JEFFERSON Calixto [facility]   Showing today's visits and meeting all other requirements  Future Appointments  No visits were found meeting these conditions.  Showing future appointments within next 150 days and meeting all other requirements     This virtual check-in was done via [employer]ded and patient was informed that this is a secure, HIPAA-compliant platform. He agrees to proceed.    Patient agrees to participate in a virtual check in via telephone or video visit instead of presenting to the office to address urgent/immediate medical needs. Patient is aware this is a billable service. He acknowledged consent and understanding of privacy and security of the video platform. The patient has agreed to participate and understands they can discontinue the visit at any time.    After connecting through Televideo, the patient was identified by name and date of birth. Gregory Sanchez was informed that this was a telemedicine visit and that the exam was being conducted confidentially over secure lines. My office door was closed. No one else was in the room. Gregory Sanchez acknowledged consent and understanding of privacy and security of the telemedicine visit. I informed the patient that I have reviewed his record in Epic and presented the opportunity for him to ask any questions regarding the visit today. The patient agreed to participate.     Verification of patient location:  Patient is located in the following state in which I hold an active license: [place]    Subjective:   Gregory Sanchez is a 33 y.o. male who is concerned about COVID-19. Patient's symptoms include fever, fatigue, nasal congestion, rhinorrhea, cough,  diarrhea and headache. Patient denies chills.     - Date of symptom onset: 2/14/2024      COVID-19 vaccination status: Fully vaccinated with booster    Exposure:   Contact with a person who is under investigation (PUI) for or who is positive for COVID-19 within the last 14 days?: Yes    Lab Results   Component Value Date    SARSCOV2 Negative 04/07/2022    SARSCOV2 Not Detected 11/17/2020    SARSCORONAVI Not Detected 10/27/2022       Review of Systems   Constitutional:  Positive for activity change, fatigue and fever. Negative for appetite change, chills and diaphoresis.   HENT:  Positive for congestion and rhinorrhea.    Respiratory:  Positive for cough.    Gastrointestinal:  Positive for diarrhea.   Neurological:  Positive for headaches.     Current Outpatient Medications on File Prior to Visit   Medication Sig    Continuous Blood Gluc  (Dexcom G6 ) LEONARD Use 1 application every 14 (fourteen) days    Continuous Blood Gluc Sensor (Dexcom G6 Sensor) MISC TEST BLOOD SUGAR AS DIRECTED. CHANGE SENSOR EVERY 10 DAYS.    Continuous Blood Gluc Transmit (Dexcom G6 Transmitter) MISC Inject 1 Units under the skin every 6 (six) months    HumaLOG 100 UNIT/ML injection Inject 50 Units under the skin 3 (three) times a day with meals    HumaLOG 100 UNIT/ML injection inject 50 units under the skin daily as directed (Patient not taking: Reported on 2/7/2024)    insulin glargine (LANTUS) 100 units/mL subcutaneous injection Inject 20 Units under the skin daily at bedtime       Objective:    There were no vitals taken for this visit.       Physical Exam  Constitutional:       Appearance: Normal appearance.   HENT:      Head: Normocephalic and atraumatic.   Pulmonary:      Effort: Pulmonary effort is normal.   Neurological:      General: No focal deficit present.      Mental Status: He is alert and oriented to person, place, and time.   Psychiatric:         Mood and Affect: Mood normal.         Behavior: Behavior normal.        JEFFERSON Calixto

## 2024-02-15 NOTE — LETTER
February 15, 2024     Patient: Gregory Sanchez  YOB: 1990  Date of Visit: 2/15/2024      To Whom it May Concern:    Gregory Sanchez is under my professional care. Gregory was seen in my office on 2/15/2024. Follow work protocol/CDC guidelines for COVID, Covid symptoms started 2/14    If you have any questions or concerns, please don't hesitate to call.         Sincerely,          JEFFERSON Calixto        CC: No Recipients

## 2024-02-20 DIAGNOSIS — E10.65 TYPE 1 DIABETES MELLITUS WITH HYPERGLYCEMIA (HCC): ICD-10-CM

## 2024-02-20 RX ORDER — PROCHLORPERAZINE 25 MG/1
SUPPOSITORY RECTAL
Qty: 3 EACH | Refills: 1 | Status: SHIPPED | OUTPATIENT
Start: 2024-02-20

## 2024-04-18 DIAGNOSIS — E10.65 TYPE 1 DIABETES MELLITUS WITH HYPERGLYCEMIA (HCC): ICD-10-CM

## 2024-04-18 RX ORDER — PROCHLORPERAZINE 25 MG/1
SUPPOSITORY RECTAL
Qty: 3 EACH | Refills: 0 | Status: SHIPPED | OUTPATIENT
Start: 2024-04-18

## 2024-05-18 DIAGNOSIS — E10.65 TYPE 1 DIABETES MELLITUS WITH HYPERGLYCEMIA (HCC): ICD-10-CM

## 2024-05-19 RX ORDER — PROCHLORPERAZINE 25 MG/1
SUPPOSITORY RECTAL
Qty: 3 EACH | Refills: 5 | Status: SHIPPED | OUTPATIENT
Start: 2024-05-19

## 2024-06-28 DIAGNOSIS — E10.9 WELL CONTROLLED TYPE 1 DIABETES MELLITUS (HCC): ICD-10-CM

## 2024-06-28 RX ORDER — INSULIN LISPRO 100 [IU]/ML
INJECTION, SOLUTION INTRAVENOUS; SUBCUTANEOUS
Qty: 150 ML | Refills: 0 | Status: SHIPPED | OUTPATIENT
Start: 2024-06-28

## 2024-06-28 NOTE — TELEPHONE ENCOUNTER
Patient needs updated blood work and has previously placed orders. Please contact patient to go for labs. Courtesy refill provided.    A1C   Consent (Marginal Mandibular)/Introductory Paragraph: The rationale for Mohs was explained to the patient and consent was obtained. The risks, benefits and alternatives to therapy were discussed in detail. Specifically, the risks of damage to the marginal mandibular branch of the facial nerve, infection, scarring, bleeding, prolonged wound healing, incomplete removal, allergy to anesthesia, and recurrence were addressed. Prior to the procedure, the treatment site was clearly identified and confirmed by the patient. All components of Universal Protocol/PAUSE Rule completed.

## 2024-08-08 ENCOUNTER — TELEPHONE (OUTPATIENT)
Dept: FAMILY MEDICINE CLINIC | Facility: CLINIC | Age: 34
End: 2024-08-08

## 2024-08-15 ENCOUNTER — LAB (OUTPATIENT)
Dept: LAB | Facility: CLINIC | Age: 34
End: 2024-08-15
Payer: COMMERCIAL

## 2024-08-15 DIAGNOSIS — E10.65 TYPE 1 DIABETES MELLITUS WITH HYPERGLYCEMIA (HCC): ICD-10-CM

## 2024-08-15 LAB
ALBUMIN SERPL BCG-MCNC: 4.6 G/DL (ref 3.5–5)
ALP SERPL-CCNC: 55 U/L (ref 34–104)
ALT SERPL W P-5'-P-CCNC: 15 U/L (ref 7–52)
ANION GAP SERPL CALCULATED.3IONS-SCNC: 7 MMOL/L (ref 4–13)
AST SERPL W P-5'-P-CCNC: 20 U/L (ref 13–39)
BILIRUB SERPL-MCNC: 0.79 MG/DL (ref 0.2–1)
BUN SERPL-MCNC: 10 MG/DL (ref 5–25)
CALCIUM SERPL-MCNC: 9.5 MG/DL (ref 8.4–10.2)
CHLORIDE SERPL-SCNC: 102 MMOL/L (ref 96–108)
CHOLEST SERPL-MCNC: 147 MG/DL
CO2 SERPL-SCNC: 27 MMOL/L (ref 21–32)
CREAT SERPL-MCNC: 0.85 MG/DL (ref 0.6–1.3)
CREAT UR-MCNC: 60.4 MG/DL
ERYTHROCYTE [DISTWIDTH] IN BLOOD BY AUTOMATED COUNT: 12.5 % (ref 11.6–15.1)
EST. AVERAGE GLUCOSE BLD GHB EST-MCNC: 148 MG/DL
GFR SERPL CREATININE-BSD FRML MDRD: 116 ML/MIN/1.73SQ M
GLUCOSE P FAST SERPL-MCNC: 96 MG/DL (ref 65–99)
HBA1C MFR BLD: 6.8 %
HCT VFR BLD AUTO: 44.6 % (ref 36.5–49.3)
HDLC SERPL-MCNC: 75 MG/DL
HGB BLD-MCNC: 15 G/DL (ref 12–17)
LDLC SERPL CALC-MCNC: 66 MG/DL (ref 0–100)
MCH RBC QN AUTO: 29.7 PG (ref 26.8–34.3)
MCHC RBC AUTO-ENTMCNC: 33.6 G/DL (ref 31.4–37.4)
MCV RBC AUTO: 88 FL (ref 82–98)
MICROALBUMIN UR-MCNC: <7 MG/L
PLATELET # BLD AUTO: 235 THOUSANDS/UL (ref 149–390)
PMV BLD AUTO: 10.2 FL (ref 8.9–12.7)
POTASSIUM SERPL-SCNC: 4.4 MMOL/L (ref 3.5–5.3)
PROT SERPL-MCNC: 7.5 G/DL (ref 6.4–8.4)
RBC # BLD AUTO: 5.05 MILLION/UL (ref 3.88–5.62)
SODIUM SERPL-SCNC: 136 MMOL/L (ref 135–147)
TRIGL SERPL-MCNC: 30 MG/DL
TSH SERPL DL<=0.05 MIU/L-ACNC: 2.11 UIU/ML (ref 0.45–4.5)
WBC # BLD AUTO: 6.43 THOUSAND/UL (ref 4.31–10.16)

## 2024-08-15 PROCEDURE — 83036 HEMOGLOBIN GLYCOSYLATED A1C: CPT

## 2024-08-15 PROCEDURE — 85027 COMPLETE CBC AUTOMATED: CPT

## 2024-08-15 PROCEDURE — 82043 UR ALBUMIN QUANTITATIVE: CPT

## 2024-08-15 PROCEDURE — 84443 ASSAY THYROID STIM HORMONE: CPT

## 2024-08-15 PROCEDURE — 80061 LIPID PANEL: CPT

## 2024-08-15 PROCEDURE — 82570 ASSAY OF URINE CREATININE: CPT

## 2024-08-15 PROCEDURE — 36415 COLL VENOUS BLD VENIPUNCTURE: CPT

## 2024-08-15 PROCEDURE — 80053 COMPREHEN METABOLIC PANEL: CPT

## 2024-08-16 ENCOUNTER — OFFICE VISIT (OUTPATIENT)
Dept: FAMILY MEDICINE CLINIC | Facility: CLINIC | Age: 34
End: 2024-08-16
Payer: COMMERCIAL

## 2024-08-16 ENCOUNTER — TELEPHONE (OUTPATIENT)
Age: 34
End: 2024-08-16

## 2024-08-16 VITALS
HEART RATE: 76 BPM | RESPIRATION RATE: 18 BRPM | OXYGEN SATURATION: 97 % | BODY MASS INDEX: 41.42 KG/M2 | HEIGHT: 62 IN | SYSTOLIC BLOOD PRESSURE: 133 MMHG | TEMPERATURE: 96.3 F | DIASTOLIC BLOOD PRESSURE: 82 MMHG

## 2024-08-16 DIAGNOSIS — F33.9 RECURRENT DEPRESSION (HCC): ICD-10-CM

## 2024-08-16 DIAGNOSIS — E10.65 TYPE 1 DIABETES MELLITUS WITH HYPERGLYCEMIA (HCC): ICD-10-CM

## 2024-08-16 DIAGNOSIS — G56.03 BILATERAL CARPAL TUNNEL SYNDROME: Primary | ICD-10-CM

## 2024-08-16 LAB
LEFT EYE DIABETIC RETINOPATHY: NORMAL
LEFT EYE IMAGE QUALITY: NORMAL
LEFT EYE MACULAR EDEMA: NORMAL
LEFT EYE OTHER RETINOPATHY: NORMAL
RIGHT EYE DIABETIC RETINOPATHY: NORMAL
RIGHT EYE IMAGE QUALITY: NORMAL
RIGHT EYE MACULAR EDEMA: NORMAL
RIGHT EYE OTHER RETINOPATHY: NORMAL
SEVERITY (EYE EXAM): NORMAL

## 2024-08-16 PROCEDURE — 99214 OFFICE O/P EST MOD 30 MIN: CPT | Performed by: NURSE PRACTITIONER

## 2024-08-16 NOTE — ASSESSMENT & PLAN NOTE
Has been wearing wrist splints at hs  Has had injections in the past  EMG warranted, Ortho referral provided

## 2024-08-16 NOTE — ASSESSMENT & PLAN NOTE
A1c under good control, does follow with endocrinology.  No changes necessary at this time.  Lab Results   Component Value Date    HGBA1C 6.8 (H) 08/15/2024

## 2024-08-16 NOTE — PROGRESS NOTES
OFFICE VISIT  Gregory Sanchez 33 y.o. male MRN: [id number]          Assessment / Plan:  Problem List Items Addressed This Visit          Endocrine    Type 1 diabetes mellitus with hyperglycemia (HCC)     A1c under good control, does follow with endocrinology.  No changes necessary at this time.  Lab Results   Component Value Date    HGBA1C 6.8 (H) 08/15/2024            Relevant Orders    IRIS Diabetic eye exam (Completed)    Comprehensive metabolic panel    CBC    Lipid Panel with Direct LDL reflex    TSH, 3rd generation with Free T4 reflex    Albumin / creatinine urine ratio       Nervous and Auditory    Bilateral carpal tunnel syndrome - Primary     Has been wearing wrist splints at hs  Has had injections in the past  EMG warranted, Ortho referral provided         Relevant Orders    Ambulatory Referral to Orthopedic Surgery    EMG 2 Limb Upper Extremity       Behavioral Health    Recurrent depression (HCC)     Stable off medication, overall doing well.              Reason For Visit / Chief Complaint  Chief Complaint   Patient presents with    Follow-up     6 month f/u (labs)        HPI:  Gregory Sanchez is a 33 y.o. male who presents today for routine follow-up.  Patient has known type 1 diabetes, does follow with endocrinology.  Overall he reports good moods, working.  Enjoying his day-to-day.  Although he does complain of Bilateral hand pain, numbness tingling.  This has been a chronic problem and worsening over time.  He does play the guitar.  He is right hand dominant     Historical Information   History reviewed. No pertinent past medical history.  Past Surgical History:   Procedure Laterality Date    ANKLE FRACTURE SURGERY Left      Social History   Social History     Substance and Sexual Activity   Alcohol Use Yes    Comment: occasional     Social History     Substance and Sexual Activity   Drug Use Not Currently    Types: Marijuana    Comment: medical card     Social History     Tobacco Use   Smoking Status  Former    Current packs/day: 0.00    Average packs/day: 1 pack/day for 10.0 years (10.0 ttl pk-yrs)    Types: Cigarettes    Start date:     Quit date:     Years since quittin.6   Smokeless Tobacco Never     Family History   Problem Relation Age of Onset    Depression Mother     Fibromyalgia Mother     Anxiety disorder Mother     Hypertension Father     Allergies Brother        Meds/Allergies   Allergies   Allergen Reactions    Grass Pollen([id number] James) Cough and Sneezing       Meds:    Current Outpatient Medications:     insulin glargine (LANTUS) 100 units/mL subcutaneous injection, Inject 20 Units under the skin daily at bedtime, Disp: 6 mL, Rfl: 0    insulin lispro (HumaLOG) 100 units/mL injection, INJECT 50 UNITS UNDER THE SKIN 3 (THREE) TIMES A DAY WITH MEALS, Disp: 150 mL, Rfl: 0    Continuous Blood Gluc  (Dexcom G6 ) LEONARD, Use 1 application every 14 (fourteen) days (Patient not taking: Reported on 2024), Disp: 1 each, Rfl: 0    Continuous Blood Gluc Transmit (Dexcom G6 Transmitter) MISC, Inject 1 Units under the skin every 6 (six) months (Patient not taking: Reported on 2024), Disp: 3 each, Rfl: 5    Continuous Glucose Sensor (Dexcom G6 Sensor) MISC, TEST BLOOD SUGAR AS DIRECTED. CHANGE SENSOR EVERY 10 DAYS. (Patient not taking: Reported on 2024), Disp: 3 each, Rfl: 5    HumaLOG 100 UNIT/ML injection, inject 50 units under the skin daily as directed (Patient not taking: Reported on 2024), Disp: 60 mL, Rfl: 0      REVIEW OF SYSTEMS  Review of Systems   Constitutional:  Negative for activity change, chills, fatigue and fever.   HENT:  Negative for congestion, ear discharge, ear pain, sinus pressure, sinus pain, sore throat, tinnitus and trouble swallowing.    Eyes:  Negative for photophobia, pain, discharge, itching and visual disturbance.   Respiratory:  Negative for cough, chest tightness, shortness of breath and wheezing.    Cardiovascular:  Negative for  "chest pain and leg swelling.   Gastrointestinal:  Negative for abdominal distention, abdominal pain, constipation, diarrhea, nausea and vomiting.   Endocrine: Negative for polydipsia, polyphagia and polyuria.   Genitourinary:  Negative for dysuria and frequency.   Musculoskeletal:  Positive for arthralgias. Negative for myalgias, neck pain and neck stiffness.        Bilateral right wrist   Skin:  Negative for color change.   Neurological:  Negative for dizziness, syncope, weakness, numbness and headaches.   Hematological:  Does not bruise/bleed easily.   Psychiatric/Behavioral:  Negative for behavioral problems, confusion, self-injury, sleep disturbance and suicidal ideas. The patient is not nervous/anxious.            Current Vitals:   Blood Pressure: 133/82 (08/16/24 0933)  Pulse: 76 (08/16/24 0933)  Temperature: (!) 96.3 °F (35.7 °C) (08/16/24 0933)  Temp Source: Tympanic (08/16/24 0933)  Respirations: 18 (08/16/24 0933)  Height: 5' 1.5\" (156.2 cm) (08/16/24 0933)  SpO2: 97 % (08/16/24 0933)  [unfilled]    PHYSICAL EXAMS:  Physical Exam  Vitals and nursing note reviewed.   Constitutional:       Appearance: Normal appearance.   HENT:      Head: Normocephalic and atraumatic.   Cardiovascular:      Rate and Rhythm: Normal rate and regular rhythm.      Pulses: Normal pulses. no weak pulses.           Dorsalis pedis pulses are 2+ on the right side and 2+ on the left side.        Posterior tibial pulses are 2+ on the right side and 2+ on the left side.      Heart sounds: Normal heart sounds.   Pulmonary:      Effort: Pulmonary effort is normal.      Breath sounds: Normal breath sounds.   Musculoskeletal:      Cervical back: Normal range of motion and neck supple.   Feet:      Right foot:      Skin integrity: No ulcer, skin breakdown, erythema, warmth, callus or dry skin.      Left foot:      Skin integrity: No ulcer, skin breakdown, erythema, warmth, callus or dry skin.   Skin:     General: Skin is warm. "   Neurological:      General: No focal deficit present.      Mental Status: He is alert and oriented to person, place, and time.   Psychiatric:         Mood and Affect: Mood normal.         Behavior: Behavior normal.         Thought Content: Thought content normal.         Judgment: Judgment normal.             Lab, imaging and other studies: I have personally reviewed pertinent reports.  .     Patient's shoes and socks removed.    Right Foot/Ankle   Right Foot Inspection  Skin Exam: skin normal and skin intact. No dry skin, no warmth, no callus, no erythema, no maceration, no abnormal color, no pre-ulcer, no ulcer and no callus.     Toe Exam: ROM and strength within normal limits.     Sensory   Vibration: intact  Proprioception: intact  Monofilament testing: intact    Vascular  Capillary refills: < 3 seconds  The right DP pulse is 2+. The right PT pulse is 2+.     Left Foot/Ankle  Left Foot Inspection  Skin Exam: skin normal and skin intact. No dry skin, no warmth, no erythema, no maceration, normal color, no pre-ulcer, no ulcer and no callus.     Toe Exam: ROM and strength within normal limits.     Sensory   Vibration: intact  Proprioception: intact  Monofilament testing: intact    Vascular  Capillary refills: < 3 seconds  The left DP pulse is 2+. The left PT pulse is 2+.     Assign Risk Category  No deformity present  No loss of protective sensation  No weak pulses  Risk: 0

## 2024-08-16 NOTE — TELEPHONE ENCOUNTER
Patient called in stating he received a voicemail from Linda to come in sooner today. Patient stated the voicemail said he can come in whenever.     Attempted to warm transfer to office, and was unsuccessful.   Informed patient there was an opening at 7:40 am.    Please advise.  Please return a call to patient

## 2024-08-22 VITALS
SYSTOLIC BLOOD PRESSURE: 140 MMHG | BODY MASS INDEX: 40.85 KG/M2 | HEIGHT: 62 IN | WEIGHT: 222 LBS | DIASTOLIC BLOOD PRESSURE: 60 MMHG

## 2024-08-22 DIAGNOSIS — G56.03 BILATERAL CARPAL TUNNEL SYNDROME: ICD-10-CM

## 2024-08-22 PROCEDURE — 99203 OFFICE O/P NEW LOW 30 MIN: CPT | Performed by: SURGERY

## 2024-08-22 NOTE — PROGRESS NOTES
Assessment    Bilateral whole hand numbness/tingling      Plan    Patient recently had EMG study ordered bilateral upper extremity.  Information was provided to obtain sooner study.  Follow-up with us in the office after EMG study is complete and we will discuss likely surgical intervention as next step in treatment plan.        Subjective     HPI    Patient ID:  Gregory Sanchez is a right hand dominant 33 y.o. male here   For evaluation of the bilateral hands.  He has roughly a 10-year history of bilateral whole hand numbness and tingling, right worse than left, that has recently gotten worse.  His symptoms are now constant and gets little relief from bracing.  He has had carpal tunnel injections in the past with good relief.  He states sometimes he feels pain/numbness tingling radiating from the medial elbows.  No recent injury or trauma to either hand or wrist.  Patient does have a history of DM type 1.  No history of surgery for this problem.    The following portions of the patient's history were reviewed and updated as appropriate: allergies, current medications, past family history, past medical history, past social history, past surgical history, and problem list.    Review of Systems     Objective    Imaging:  None    Physical Exam     Vitals:    08/22/24 1301   BP: 140/60     General appearance:  NAD   Cardiac:  Regular rate  Lungs:  Unlabored breathing  Abdomen:  Non-distended    Orthopedic Examination:  Bilateral hands    Inspection: No open wounds or erythema.  No ecchymosis or swelling.  No muscle wasting.    Palpation: Nontender to palpation bony prominences of the hand and wrist and first dorsal extensor compartment    Range-of-motion: Normal wrist range of motion, full composite fist    Strength: 5/5 wrist flexion extension, , intrinsics, thumb opposition, APB    Sensation: Intact to light touch median radial ulnar nerve distribution  Normal 2-point discrimination testing throughout bilateral  hands    Special Tests: Positive Durkan's compression test bilateral wrist  Negative Tinel's bilateral wrist  Positive Tinel's medial elbows bilaterally  Palpable radial pulse bilaterally  The upper extremities are warm and well-perfused.

## 2024-09-29 DIAGNOSIS — E10.9 WELL CONTROLLED TYPE 1 DIABETES MELLITUS (HCC): ICD-10-CM

## 2024-09-29 RX ORDER — INSULIN LISPRO 100 [IU]/ML
INJECTION, SOLUTION INTRAVENOUS; SUBCUTANEOUS
Qty: 150 ML | Refills: 1 | Status: SHIPPED | OUTPATIENT
Start: 2024-09-29

## 2025-03-03 ENCOUNTER — OFFICE VISIT (OUTPATIENT)
Dept: FAMILY MEDICINE CLINIC | Facility: CLINIC | Age: 35
End: 2025-03-03
Payer: COMMERCIAL

## 2025-03-03 VITALS
HEIGHT: 62 IN | DIASTOLIC BLOOD PRESSURE: 80 MMHG | SYSTOLIC BLOOD PRESSURE: 132 MMHG | HEART RATE: 84 BPM | OXYGEN SATURATION: 97 % | WEIGHT: 217.2 LBS | BODY MASS INDEX: 39.97 KG/M2 | RESPIRATION RATE: 18 BRPM | TEMPERATURE: 98.7 F

## 2025-03-03 DIAGNOSIS — E10.65 TYPE 1 DIABETES MELLITUS WITH HYPERGLYCEMIA (HCC): ICD-10-CM

## 2025-03-03 DIAGNOSIS — Z00.00 ENCOUNTER FOR WELL ADULT EXAM WITHOUT ABNORMAL FINDINGS: Primary | ICD-10-CM

## 2025-03-03 LAB — SL AMB POCT HEMOGLOBIN AIC: 6.7 (ref ?–6.5)

## 2025-03-03 PROCEDURE — 83036 HEMOGLOBIN GLYCOSYLATED A1C: CPT | Performed by: NURSE PRACTITIONER

## 2025-03-03 PROCEDURE — 99395 PREV VISIT EST AGE 18-39: CPT | Performed by: NURSE PRACTITIONER

## 2025-03-03 NOTE — PROGRESS NOTES
Adult Annual Physical  Name: Gregory Sanchez      : 1990      MRN: [id number]  Encounter Provider: Reema Green DNP  Encounter Date: 3/3/2025   Encounter department: [facility]    Assessment & Plan  Encounter for well adult exam without abnormal findings         Type 1 diabetes mellitus with hyperglycemia (HCC)    Lab Results   Component Value Date    HGBA1C 6.8 (H) 08/15/2024       Orders:    POCT hemoglobin A1c    Immunizations and preventive care screenings were discussed with patient today. Appropriate education was printed on patient's after visit summary.    Counseling:  Alcohol/drug use: discussed moderation in alcohol intake, the recommendations for healthy alcohol use, and avoidance of illicit drug use.  Dental Health: discussed importance of regular tooth brushing, flossing, and dental visits.  Injury prevention: discussed safety/seat belts, safety helmets, smoke detectors, carbon monoxide detectors, and smoking near bedding or upholstery.  Sexual health: discussed sexually transmitted diseases, partner selection, use of condoms, avoidance of unintended pregnancy, and contraceptive alternatives.  Exercise: the importance of regular exercise/physical activity was discussed. Recommend exercise 3-5 times per week for at least 30 minutes.       Depression Screening and Follow-up Plan: Patient was screened for depression during today's encounter. They screened negative with a PHQ-9 score of 0.          History of Present Illness     Adult Annual Physical:  Patient presents for annual physical.     Diet and Physical Activity:  - Diet/Nutrition: diabetic diet.  - Exercise: moderate cardiovascular exercise.    Depression Screening:    - PHQ-9 Score: 0    General Health:  - Sleep: sleeps well.  - Hearing: normal hearing right ear and normal hearing left ear.  - Vision: vision problems and wears glasses.  - Dental: regular dental visits.     Health:  - History of STDs: no.  "  - Urinary symptoms: none.     Advanced Care Planning:  - Has an advanced directive?: no    - Has a durable medical POA?: no    - ACP document given to patient?: no      Review of Systems   Constitutional:  Negative for activity change, chills, fatigue and fever.   HENT:  Negative for congestion, ear discharge, ear pain, sinus pressure, sinus pain, sore throat, tinnitus and trouble swallowing.    Eyes:  Negative for photophobia, pain, discharge, itching and visual disturbance.   Respiratory:  Negative for cough, chest tightness, shortness of breath and wheezing.    Cardiovascular:  Negative for chest pain and leg swelling.   Gastrointestinal:  Negative for abdominal distention, abdominal pain, constipation, diarrhea, nausea and vomiting.   Endocrine: Negative for polydipsia, polyphagia and polyuria.   Genitourinary:  Negative for dysuria and frequency.   Musculoskeletal:  Negative for arthralgias, myalgias, neck pain and neck stiffness.   Skin:  Negative for color change.   Neurological:  Negative for dizziness, syncope, weakness, numbness and headaches.   Hematological:  Does not bruise/bleed easily.   Psychiatric/Behavioral:  Negative for behavioral problems, confusion, self-injury, sleep disturbance and suicidal ideas. The patient is not nervous/anxious.          Objective   /80 (BP Location: Right arm, Patient Position: Sitting, Cuff Size: Standard)   Pulse 84   Temp 98.7 °F (37.1 °C) (Tympanic)   Resp 18   Ht 5' 1.5\" (1.562 m)   Wt 98.5 kg (217 lb 3.2 oz)   SpO2 97%   BMI 40.37 kg/m²     Physical Exam  Vitals and nursing note reviewed.   Constitutional:       Appearance: Normal appearance.   HENT:      Head: Normocephalic and atraumatic.   Cardiovascular:      Rate and Rhythm: Normal rate and regular rhythm.      Pulses: Normal pulses.      Heart sounds: Normal heart sounds.   Pulmonary:      Effort: Pulmonary effort is normal.      Breath sounds: Normal breath sounds.   Musculoskeletal:      " Cervical back: Normal range of motion and neck supple.   Skin:     General: Skin is warm.   Neurological:      General: No focal deficit present.      Mental Status: He is alert and oriented to person, place, and time.   Psychiatric:         Mood and Affect: Mood normal.         Behavior: Behavior normal.         Thought Content: Thought content normal.         Judgment: Judgment normal.

## 2025-03-03 NOTE — ASSESSMENT & PLAN NOTE
Lab Results   Component Value Date    HGBA1C 6.8 (H) 08/15/2024       Orders:    POCT hemoglobin A1c

## 2025-05-19 ENCOUNTER — OFFICE VISIT (OUTPATIENT)
Dept: FAMILY MEDICINE CLINIC | Facility: CLINIC | Age: 35
End: 2025-05-19
Payer: COMMERCIAL

## 2025-05-19 VITALS
DIASTOLIC BLOOD PRESSURE: 80 MMHG | RESPIRATION RATE: 20 BRPM | HEIGHT: 71 IN | SYSTOLIC BLOOD PRESSURE: 139 MMHG | BODY MASS INDEX: 29.71 KG/M2 | TEMPERATURE: 98 F | WEIGHT: 212.2 LBS | HEART RATE: 91 BPM | OXYGEN SATURATION: 100 %

## 2025-05-19 DIAGNOSIS — S30.861A INSECT BITE OF ABDOMINAL WALL, INITIAL ENCOUNTER: ICD-10-CM

## 2025-05-19 DIAGNOSIS — R21 RASH: Primary | ICD-10-CM
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
## 2025-05-19 PROCEDURE — 99214 OFFICE O/P EST MOD 30 MIN: CPT | Performed by: NURSE PRACTITIONER

## 2025-05-19 NOTE — PROGRESS NOTES
"Name: Gregory Sanchez      : 1990      MRN: [id number]  Encounter Provider: Reema Green DNP  Encounter Date: 2025   Encounter department: [facility]  :  Assessment & Plan  Rash  Right flank area into abdomen. This is resolving with use of eucerin  Fading today in office.        Insect bite of abdominal wall, initial encounter  Red, nickel size, he will monitor for signs of infection.               History of Present Illness   Last week he noticed a rash, using Eucerin cream, has improved  He reports dry patchy area on right side. He report no itch  Does also report a bite above the area    Rash  This is a new problem. The current episode started in the past 7 days. The problem has been resolved since onset. The problem is mild. Treatments tried: eucerin. The treatment provided significant relief.     Review of Systems   Skin:  Positive for rash.       Objective   /80 (BP Location: Left arm, Patient Position: Sitting, Cuff Size: Large)   Pulse 91   Temp 98 °F (36.7 °C) (Tympanic)   Resp 20   Ht 5' 11\" (1.803 m)   Wt 96.3 kg (212 lb 3.2 oz)   SpO2 100%   BMI 29.60 kg/m²      Physical Exam  Constitutional:       Appearance: Normal appearance.   HENT:      Head: Normocephalic and atraumatic.     Cardiovascular:      Rate and Rhythm: Normal rate and regular rhythm.      Pulses: Normal pulses.      Heart sounds: Normal heart sounds.   Pulmonary:      Effort: Pulmonary effort is normal.      Breath sounds: Normal breath sounds.     Musculoskeletal:      Cervical back: Normal range of motion and neck supple.     Skin:     General: Skin is warm.      Findings: Rash present.     Neurological:      Mental Status: He is alert and oriented to person, place, and time.           "

## 2025-06-12 ENCOUNTER — OFFICE VISIT (OUTPATIENT)
Dept: OBGYN CLINIC | Facility: CLINIC | Age: 35
End: 2025-06-12
Payer: COMMERCIAL

## 2025-06-12 DIAGNOSIS — M65.331 TRIGGER FINGER, RIGHT MIDDLE FINGER: ICD-10-CM

## 2025-06-12 DIAGNOSIS — G56.03 BILATERAL CARPAL TUNNEL SYNDROME: Primary | ICD-10-CM

## 2025-06-12 PROCEDURE — 99213 OFFICE O/P EST LOW 20 MIN: CPT | Performed by: SURGERY

## 2025-06-12 PROCEDURE — 20550 NJX 1 TENDON SHEATH/LIGAMENT: CPT | Performed by: PHYSICIAN ASSISTANT

## 2025-06-12 RX ORDER — LIDOCAINE HYDROCHLORIDE 10 MG/ML
2.5 INJECTION, SOLUTION INFILTRATION; PERINEURAL
Status: COMPLETED | OUTPATIENT
Start: 2025-06-12 | End: 2025-06-12

## 2025-06-12 RX ORDER — BETAMETHASONE SODIUM PHOSPHATE AND BETAMETHASONE ACETATE 3; 3 MG/ML; MG/ML
3 INJECTION, SUSPENSION INTRA-ARTICULAR; INTRALESIONAL; INTRAMUSCULAR; SOFT TISSUE
Status: COMPLETED | OUTPATIENT
Start: 2025-06-12 | End: 2025-06-12

## 2025-06-12 RX ADMIN — LIDOCAINE HYDROCHLORIDE 2.5 ML: 10 INJECTION, SOLUTION INFILTRATION; PERINEURAL at 10:30

## 2025-06-12 RX ADMIN — BETAMETHASONE SODIUM PHOSPHATE AND BETAMETHASONE ACETATE 3 MG: 3; 3 INJECTION, SUSPENSION INTRA-ARTICULAR; INTRALESIONAL; INTRAMUSCULAR; SOFT TISSUE at 10:30

## 2025-06-12 NOTE — ASSESSMENT & PLAN NOTE
Continue conservative management for now.  Surgery is an option in the future and patient will consider this in a few months.  He will call us for further discussion in the fall.

## 2025-06-12 NOTE — PROGRESS NOTES
Name: Gregory Sanchez      : 1990      MRN: [id number]  Encounter Provider: Carlos Kraus MD  Encounter Date: 2025   Encounter department: [facility] [place]      Assessment & Plan  Bilateral carpal tunnel syndrome  Continue conservative management for now.  Surgery is an option in the future and patient will consider this in a few months.  He will call us for further discussion in the fall.       Trigger finger, right middle finger  Steroid injection provided today and the patient tolerated well.  Activities as tolerated.  May follow-up as needed.  Call the office if symptoms persist or return.    Orders:    Hand/upper extremity injection: R long A1         Chief Complaint   Patient presents with    Left Wrist - Numbness     10 years sx / CTS       Right Wrist - Numbness       History of Present Illness   Patient is a 34 y.o. male here for follow-up for a different issue.  Today he states his right middle finger has been painful for the last few months with associated clicking and active locking that came on without injury or trauma.  He has not had any formal treatment for it thus far.  He states his carpal tunnel symptoms are currently mild to moderate.      Initial H&P:  He has roughly a 10-year history of bilateral whole hand numbness and tingling, right worse than left, that has recently gotten worse.  His symptoms are now constant and gets little relief from bracing.  He has had carpal tunnel injections in the past with good relief.  He states sometimes he feels pain/numbness tingling radiating from the medial elbows.  No recent injury or trauma to either hand or wrist.  Patient does have a history of DM type 1.  No history of surgery for this problem.  Patient is Right Handed.      Review of Systems A 10 point ROS was performed; negative except as noted above.   Past Surgical History[1]   Past Medical History[2]   Allergies[3]   Objective   Current Outpatient  Medications   Medication Instructions    Continuous Blood Gluc  (Dexcom G6 ) LEONARD 1 application., Does not apply, Every 14 days    Continuous Blood Gluc Transmit (Dexcom G6 Transmitter) MISC 1 Units, Subcutaneous, Every 6 months    Continuous Glucose Sensor (Dexcom G6 Sensor) MISC TEST BLOOD SUGAR AS DIRECTED. CHANGE SENSOR EVERY 10 DAYS.    HumaLOG 100 UNIT/ML injection inject 50 units under the skin daily as directed    insulin glargine (LANTUS) 20 Units, Subcutaneous, Daily at bedtime    insulin lispro (HumaLOG) 100 units/mL injection INJECT 50 UNITS UNDER THE SKIN 3 (THREE) TIMES A DAY WITH MEALS        Imaging  EMG B/L UE 9/18/2024 - Bilateral carpal tunnel syndrome    Physical Exam    General appearance:  NAD   Cardiac:  Regular rate  Lungs:  Unlabored breathing  Abdomen:  Non-distended    Orthopedic Exam:    Right middle finger    Inspection: No open wounds or erythema.  No ecchymosis or swelling.    Palpation: Tender to palpation A1 pulley with palpable nodule    Range-of-motion: Active clicking and locking with range of motion    Strength: 5/5     Sensation:  ILT m/r/u nerve distributions.    Special Tests:  Palpable radial pulse  UE warm and well perfused.  Good cap refill at the fingertip.    Hand/upper extremity injection: R long A1    Universal Protocol:  Consent: Verbal consent obtained  Risks and benefits: risks, benefits and alternatives were discussed  Consent given by: patient  Patient identity confirmed: verbally with patient  Supporting Documentation  Indications: pain and tendon swelling   Procedure Details  Condition:trigger finger Location: long finger - R long A1   Preparation: Patient was prepped and draped in the usual sterile fashion  Needle size: 22 G  Ultrasound guidance: no  Medications administered: 2.5 mL lidocaine 1 %; 3 mg betamethasone acetate-betamethasone sodium phosphate 6 (3-3) mg/mL  Patient tolerance: patient tolerated the procedure well with no immediate  complications  Dressing:  Sterile dressing applied             Tray Yost PA-C          [1]   Past Surgical History:  Procedure Laterality Date    ANKLE FRACTURE SURGERY Left    [2] No past medical history on file.  [3]   Allergies  Allergen Reactions    Grass Pollen([id number] James) Cough and Sneezing